# Patient Record
Sex: FEMALE | Race: WHITE | Employment: OTHER | URBAN - METROPOLITAN AREA
[De-identification: names, ages, dates, MRNs, and addresses within clinical notes are randomized per-mention and may not be internally consistent; named-entity substitution may affect disease eponyms.]

---

## 2017-11-25 ENCOUNTER — HOSPITAL ENCOUNTER (OUTPATIENT)
Dept: MAMMOGRAPHY | Age: 42
Discharge: HOME | End: 2017-11-25
Attending: OBSTETRICS & GYNECOLOGY
Payer: COMMERCIAL

## 2017-11-25 DIAGNOSIS — Z12.39 SPECIAL SCREENING EXAMINATION FOR NEOPLASM OF BREAST: ICD-10-CM

## 2017-11-25 PROCEDURE — 77067 SCR MAMMO BI INCL CAD: CPT | Performed by: OBSTETRICS & GYNECOLOGY

## 2017-11-25 PROCEDURE — 77063 BREAST TOMOSYNTHESIS BI: CPT | Performed by: OBSTETRICS & GYNECOLOGY

## 2018-07-06 ENCOUNTER — APPOINTMENT (OUTPATIENT)
Dept: GENERAL RADIOLOGY | Facility: HOSPITAL | Age: 43
End: 2018-07-06
Attending: EMERGENCY MEDICINE
Payer: COMMERCIAL

## 2018-07-06 ENCOUNTER — HOSPITAL ENCOUNTER (EMERGENCY)
Facility: HOSPITAL | Age: 43
Discharge: HOME | End: 2018-07-06
Attending: EMERGENCY MEDICINE
Payer: COMMERCIAL

## 2018-07-06 VITALS
WEIGHT: 168 LBS | SYSTOLIC BLOOD PRESSURE: 141 MMHG | HEART RATE: 62 BPM | BODY MASS INDEX: 26 KG/M2 | OXYGEN SATURATION: 100 % | RESPIRATION RATE: 16 BRPM | DIASTOLIC BLOOD PRESSURE: 89 MMHG

## 2018-07-06 DIAGNOSIS — S39.012A STRAIN OF LUMBAR REGION, INITIAL ENCOUNTER: Primary | ICD-10-CM

## 2018-07-06 LAB — POCT URINE PREGNANCY: NEGATIVE

## 2018-07-06 PROCEDURE — 72110 X-RAY EXAM L-2 SPINE 4/>VWS: CPT | Performed by: EMERGENCY MEDICINE

## 2018-07-06 PROCEDURE — 99283 EMERGENCY DEPT VISIT LOW MDM: CPT

## 2018-07-06 PROCEDURE — 96372 THER/PROPH/DIAG INJ SC/IM: CPT

## 2018-07-06 RX ORDER — CYCLOBENZAPRINE HCL 10 MG
10 TABLET ORAL 3 TIMES DAILY PRN
Qty: 15 TABLET | Refills: 0 | Status: SHIPPED | OUTPATIENT
Start: 2018-07-06 | End: 2019-07-26

## 2018-07-06 RX ORDER — METHYLPREDNISOLONE 4 MG/1
TABLET ORAL
Qty: 1 PACKAGE | Refills: 0 | Status: SHIPPED | OUTPATIENT
Start: 2018-07-06 | End: 2018-07-11

## 2018-07-06 RX ORDER — KETOROLAC TROMETHAMINE 30 MG/ML
60 INJECTION, SOLUTION INTRAMUSCULAR; INTRAVENOUS ONCE
Status: COMPLETED | OUTPATIENT
Start: 2018-07-06 | End: 2018-07-06

## 2018-07-06 RX ORDER — PREDNISONE 20 MG/1
60 TABLET ORAL ONCE
Status: COMPLETED | OUTPATIENT
Start: 2018-07-06 | End: 2018-07-06

## 2018-07-06 NOTE — ED PROVIDER NOTES
Patient Seen in: [facility] Emergency Department    History   Patient presents with:  Back Pain (musculoskeletal)    Stated Complaint: BACK PAIN    HPI    The patient is a 45-year-old female who presents emergency room with a history of straining he nontoxic in appearance. HEENT: Head is normocephalic, atraumatic. Pupils are 4 mm equally round and reactive to light. Oropharynx is clear. Mucous membranes are moist.  NECK: There is no focal tenderness to palpation appreciated. There is no JVD.  No menin (cpt=72110)    Result Date: 7/6/2018  CONCLUSION:    No fracture, subluxation, scoliosis, pars defect, abnormal bone density, disc space narrowing, facet joint space narrowing, destructive process or paraspinal calcifications or mass evident.   Dictated by: 1 tablet (10 mg total) by mouth 3 (three) times daily as needed.   Qty: 15 tablet Refills: 0

## 2019-07-26 ENCOUNTER — OFFICE VISIT (OUTPATIENT)
Dept: FAMILY MEDICINE CLINIC | Facility: CLINIC | Age: 44
End: 2019-07-26
Payer: COMMERCIAL

## 2019-07-26 VITALS
DIASTOLIC BLOOD PRESSURE: 78 MMHG | HEART RATE: 92 BPM | TEMPERATURE: 99 F | OXYGEN SATURATION: 97 % | HEIGHT: 69 IN | WEIGHT: 180 LBS | RESPIRATION RATE: 18 BRPM | SYSTOLIC BLOOD PRESSURE: 112 MMHG | BODY MASS INDEX: 26.66 KG/M2

## 2019-07-26 DIAGNOSIS — J00 ACUTE NASOPHARYNGITIS: ICD-10-CM

## 2019-07-26 DIAGNOSIS — H66.91 ACUTE RIGHT OTITIS MEDIA: Primary | ICD-10-CM

## 2019-07-26 PROCEDURE — 99213 OFFICE O/P EST LOW 20 MIN: CPT | Performed by: NURSE PRACTITIONER

## 2019-07-26 RX ORDER — IBUPROFEN 200 MG
200 TABLET ORAL EVERY 6 HOURS PRN
COMMUNITY
End: 2019-12-16 | Stop reason: ALTCHOICE

## 2019-07-26 RX ORDER — AMOXICILLIN 875 MG/1
875 TABLET, COATED ORAL 2 TIMES DAILY
Qty: 20 TABLET | Refills: 0 | Status: SHIPPED | OUTPATIENT
Start: 2019-07-26 | End: 2019-08-05

## 2019-07-26 NOTE — PATIENT INSTRUCTIONS
· It is very important to complete full course of antibiotic. · Rest and fluids  · Flonase 2 sprays in each nostril daily for 10 -14 days.     · Mucinex DM or Robitussin DM as packet insert  · Acetaminophen or ibuprofen according to package instructions a recent studies have shown that the symptoms of ear infections often go away in a couple of days without antibiotics. Bacteria can become resistant to antibiotics, and the medicine may cause side effects.  For these reasons, your healthcare provider may wait become as active as is comfortable. Ask your provider if you can take aspirin, acetaminophen, or ibuprofen to control your fever. Anyone under the age of 24 with a viral illness should not take aspirin because of the increased risk of Reye's syndrome.

## 2019-07-26 NOTE — PROGRESS NOTES
CHIEF COMPLAINT:   Patient presents with:  Sore Throat: hurts to swallow, facial pressure/headache, bilateral ear pain/pressure // x3 days      HPI:   Kathy Donnie is a 40year old female who presents to clinic today with complaints of bilteral ear p nostrils patent, clear nasal discharge, nasal mucosa pink and noninflamed  THROAT: oral mucosa pink, moist. Posterior pharynx is not erythematous or injected. No exudates. NECK: supple, non-tender  LUNGS: clear to auscultation bilaterally.  Breathing is no infection or allergic reaction can cause swelling of the tube between your ear and throat (the eustachian tube). The swelling may trap bacteria in your middle ear, resulting in a bacterial infection.    Pressure from the buildup of pus or fluid in the ear s you may need to take a different antibiotic or other medicine. In this case, it may take another 1 to 2 weeks before your ear feels normal again. What can I do to take care of myself? Follow your healthcare provider's instructions.    If you are taking infections often, ask your healthcare provider if you need to be checked for allergies. Getting treatment for allergies may help prevent ear infections. Ask if using decongestants when you have a cold may help prevent you from getting ear infections.    D

## 2019-08-13 ENCOUNTER — OFFICE VISIT (OUTPATIENT)
Dept: FAMILY MEDICINE CLINIC | Facility: CLINIC | Age: 44
End: 2019-08-13
Payer: COMMERCIAL

## 2019-08-13 VITALS
OXYGEN SATURATION: 98 % | WEIGHT: 183 LBS | SYSTOLIC BLOOD PRESSURE: 120 MMHG | HEIGHT: 69 IN | DIASTOLIC BLOOD PRESSURE: 70 MMHG | TEMPERATURE: 99 F | HEART RATE: 91 BPM | BODY MASS INDEX: 27.11 KG/M2 | RESPIRATION RATE: 16 BRPM

## 2019-08-13 DIAGNOSIS — J00 ACUTE NASOPHARYNGITIS: ICD-10-CM

## 2019-08-13 DIAGNOSIS — H66.91 RIGHT ACUTE OTITIS MEDIA: Primary | ICD-10-CM

## 2019-08-13 PROCEDURE — 99213 OFFICE O/P EST LOW 20 MIN: CPT | Performed by: NURSE PRACTITIONER

## 2019-08-13 RX ORDER — CEFDINIR 300 MG/1
300 CAPSULE ORAL 2 TIMES DAILY
Qty: 20 CAPSULE | Refills: 0 | Status: SHIPPED | OUTPATIENT
Start: 2019-08-13 | End: 2019-08-23

## 2019-08-13 NOTE — PATIENT INSTRUCTIONS
· It is very important to complete full course of antibiotic.    · Flonase as prescribed   · Acetaminophen or ibuprofen according to package instructions as needed for pain  · Call or return if symptoms worsen, do not improve in 3 days, or if fever of 100.4 without antibiotics. Bacteria can become resistant to antibiotics, and the medicine may cause side effects. For these reasons, your healthcare provider may wait 1 to 3 days to see if the symptoms go away on their own before prescribing an antibiotic.    You ibuprofen to control your fever. Anyone under the age of 24 with a viral illness should not take aspirin because of the increased risk of Reye's syndrome. Keep a daily record of your temperature.    Call your healthcare provider if you have:   a temperatu

## 2019-08-13 NOTE — PROGRESS NOTES
CHIEF COMPLAINT:   Patient presents with:  Ear Pain      HPI:   Chandu Wall is a 40year old female who presents to clinic today with complaints of ear pain. Has had for 9  days. Pain is described as sharp.   Patient reports history of ear infections No exudates. NECK: supple, non-tender  LUNGS: clear to auscultation bilaterally. Breathing is non labored.   CARDIO: RRR without murmur  EXTREMITIES: no cyanosis, clubbing or edema  LYMPH: No auricular lymphadenopathy; + right anterior cervical lymphadenop bacteria in your middle ear, resulting in a bacterial infection. Pressure from the buildup of pus or fluid in the ear sometimes causes the eardrum to tear (rupture).  The eardrum is a thin membrane that separates the delicate parts of the middle ear from normal again. What can I do to take care of myself? Follow your healthcare provider's instructions. If you are taking an antibiotic, take all of it according to the directions, even when the symptoms have gone away before you have finished it.    To h ear infections. Ask if using decongestants when you have a cold may help prevent you from getting ear infections. Developed by [address]   Published by [address].    Last modified: 2008-01-15           Patient voiced understanding and is in agreemen

## 2019-08-23 ENCOUNTER — HOSPITAL ENCOUNTER (OUTPATIENT)
Dept: MAMMOGRAPHY | Age: 44
Discharge: HOME | End: 2019-08-23
Attending: OBSTETRICS & GYNECOLOGY
Payer: COMMERCIAL

## 2019-08-23 DIAGNOSIS — Z12.31 SCREENING MAMMOGRAM, ENCOUNTER FOR: ICD-10-CM

## 2019-08-23 PROCEDURE — 77063 BREAST TOMOSYNTHESIS BI: CPT | Performed by: OBSTETRICS & GYNECOLOGY

## 2019-08-23 PROCEDURE — 77067 SCR MAMMO BI INCL CAD: CPT | Performed by: OBSTETRICS & GYNECOLOGY

## 2019-08-29 ENCOUNTER — OFFICE VISIT (OUTPATIENT)
Dept: FAMILY MEDICINE CLINIC | Facility: CLINIC | Age: 44
End: 2019-08-29
Payer: COMMERCIAL

## 2019-08-29 VITALS
DIASTOLIC BLOOD PRESSURE: 82 MMHG | RESPIRATION RATE: 16 BRPM | HEART RATE: 80 BPM | TEMPERATURE: 98 F | WEIGHT: 184.63 LBS | SYSTOLIC BLOOD PRESSURE: 136 MMHG | HEIGHT: 67.75 IN | BODY MASS INDEX: 28.31 KG/M2

## 2019-08-29 DIAGNOSIS — J02.9 SORE THROAT: Primary | ICD-10-CM

## 2019-08-29 PROCEDURE — 99213 OFFICE O/P EST LOW 20 MIN: CPT | Performed by: NURSE PRACTITIONER

## 2019-08-29 RX ORDER — LORATADINE 10 MG/1
1 CAPSULE, LIQUID FILLED ORAL DAILY
Qty: 30 CAPSULE | Refills: 0 | Status: SHIPPED | OUTPATIENT
Start: 2019-08-29 | End: 2019-09-28

## 2019-08-29 RX ORDER — PREDNISONE 20 MG/1
40 TABLET ORAL DAILY
Qty: 10 TABLET | Refills: 0 | Status: SHIPPED | OUTPATIENT
Start: 2019-08-29 | End: 2019-12-16

## 2019-08-29 NOTE — PROGRESS NOTES
Patient presents with:  Sore Throat: Itchy sore throat beginning yesterday. Recently has had 2 ear infecections treated with antibiotic each time. Finished last course 3 days ago.       HPI:  Presents with 1 day history of sore throat, cough with no producti No facial tenderness. Eyes: Conjunctivae are pink and moist without exudate or drainage. Lymphadenopathy: No cervical adenopathy. Cardiovascular: Normal rate, regular rhythm. No murmur. Pulmonary/Chest: No respiratory distress.  Effort normal. Breat

## 2019-12-16 ENCOUNTER — OFFICE VISIT (OUTPATIENT)
Dept: FAMILY MEDICINE CLINIC | Facility: CLINIC | Age: 44
End: 2019-12-16
Payer: COMMERCIAL

## 2019-12-16 VITALS
BODY MASS INDEX: 31.55 KG/M2 | WEIGHT: 201 LBS | TEMPERATURE: 99 F | HEIGHT: 67 IN | SYSTOLIC BLOOD PRESSURE: 122 MMHG | OXYGEN SATURATION: 100 % | HEART RATE: 88 BPM | DIASTOLIC BLOOD PRESSURE: 82 MMHG

## 2019-12-16 DIAGNOSIS — R68.89 FLU-LIKE SYMPTOMS: Primary | ICD-10-CM

## 2019-12-16 PROCEDURE — 87502 INFLUENZA DNA AMP PROBE: CPT | Performed by: FAMILY MEDICINE

## 2019-12-16 PROCEDURE — 99213 OFFICE O/P EST LOW 20 MIN: CPT | Performed by: FAMILY MEDICINE

## 2019-12-16 NOTE — PATIENT INSTRUCTIONS
Most viral illnesses get worse for the first 3-5 days, then plateau and improve gradually over the next 3-5 days. Monitor symptoms for now.    Use OTC meds for comfort as needed--  Use Benadryl at bedtime to reduce drainage and promote rest.  Zyrtec/Barb

## 2019-12-16 NOTE — PROGRESS NOTES
Patient presents with:  Cold: Started friday, productive cough, head pressure, ear pressure, temp of 101.5 at home, diarrhea      SUBJECTIVE:   Eliana Carr is a 40year old female who presents complaining of flu-like symptoms of fever to 101.5, malais 201 lb (91.2 kg)   LMP 12/02/2019 (Approximate)   SpO2 100%   BMI 31.48 kg/m²   Gen:  Appears moderately ill but not toxic. Ears:  Canals clear, TMs sharp bilaterally, no erythema, no bulging or effusion.   Sinuses: Non tender  Oropharynx:  Mildly erythemi follow-up for further evaluation. pt verbalized understanding of the above and agrees to plan.

## 2020-11-17 ENCOUNTER — TELEPHONE (OUTPATIENT)
Dept: FAMILY MEDICINE CLINIC | Facility: CLINIC | Age: 45
End: 2020-11-17

## 2020-11-17 NOTE — TELEPHONE ENCOUNTER
Pt is having issues with her sciatic nerve for about 3 months and she can barely walk and wants to talk to someone about this.

## 2020-11-17 NOTE — TELEPHONE ENCOUNTER
LOV  8/29/2019 with Cristel Laurent FNP. 3 months ago, giving herself a Vitamin B12 shot of 0.5cc with a TB syringe in the R buttock she thinks she hit a nerve. She had immediate pain down the back of the R leg and the skin bubbled up.  The pain has been present

## 2020-11-19 ENCOUNTER — OFFICE VISIT (OUTPATIENT)
Dept: FAMILY MEDICINE CLINIC | Facility: CLINIC | Age: 45
End: 2020-11-19
Payer: COMMERCIAL

## 2020-11-19 VITALS
OXYGEN SATURATION: 98 % | HEIGHT: 68.5 IN | SYSTOLIC BLOOD PRESSURE: 120 MMHG | RESPIRATION RATE: 16 BRPM | BODY MASS INDEX: 26.91 KG/M2 | DIASTOLIC BLOOD PRESSURE: 70 MMHG | WEIGHT: 179.63 LBS | TEMPERATURE: 97 F | HEART RATE: 72 BPM

## 2020-11-19 DIAGNOSIS — G57.01 SCIATIC NERVE LESION, RIGHT: Primary | ICD-10-CM

## 2020-11-19 PROCEDURE — 3078F DIAST BP <80 MM HG: CPT | Performed by: FAMILY MEDICINE

## 2020-11-19 PROCEDURE — 99213 OFFICE O/P EST LOW 20 MIN: CPT | Performed by: FAMILY MEDICINE

## 2020-11-19 PROCEDURE — 99072 ADDL SUPL MATRL&STAF TM PHE: CPT | Performed by: FAMILY MEDICINE

## 2020-11-19 PROCEDURE — 3008F BODY MASS INDEX DOCD: CPT | Performed by: FAMILY MEDICINE

## 2020-11-19 PROCEDURE — 3074F SYST BP LT 130 MM HG: CPT | Performed by: FAMILY MEDICINE

## 2020-11-19 RX ORDER — PYRIDOXINE HCL (VITAMIN B6) 50 MG
1 TABLET ORAL
COMMUNITY

## 2020-11-19 RX ORDER — METAXALONE 800 MG/1
TABLET ORAL 3 TIMES DAILY PRN
Qty: 30 TABLET | Refills: 1 | Status: SHIPPED | OUTPATIENT
Start: 2020-11-19 | End: 2020-12-09

## 2020-11-19 RX ORDER — PREDNISONE 10 MG/1
TABLET ORAL
Qty: 35 TABLET | Refills: 0 | Status: SHIPPED | OUTPATIENT
Start: 2020-11-19 | End: 2020-12-09

## 2020-11-19 NOTE — PROGRESS NOTES
Patient presents with:  Back Pain: sciatica  Immunization/Injection: declines flu vaccine      Subjective   HPI:   This is a 39year old female coming in for worsening sciatica x 3 moths.  Also gets b 12 and this summerafter shot, had immidate pain radiatin side and 2+ on the left side. Bicep reflexes are 2+ on the right side and 2+ on the left side. Brachioradialis reflexes are 2+ on the right side and 2+ on the left side. Patellar reflexes are 2+ on the right side and 2+ on the left side.

## 2020-12-17 ENCOUNTER — TELEPHONE (OUTPATIENT)
Dept: FAMILY MEDICINE CLINIC | Facility: CLINIC | Age: 45
End: 2020-12-17

## 2020-12-17 DIAGNOSIS — G57.01 SCIATIC NERVE LESION, RIGHT: Primary | ICD-10-CM

## 2020-12-17 NOTE — TELEPHONE ENCOUNTER
Patient saw Dr. Fiorella Boyd 11/19/20 for sciatic pain and was told if it didn't improve she may need an MRI, patient states pain has not resolved, please place order for MRI

## 2020-12-17 NOTE — TELEPHONE ENCOUNTER
Needs XR l spine 1st, last was > 2 years ago. They will deniy MRI if no recent Xray. Diagnoses and all orders for this visit:    Sciatic nerve lesion, right  -     XR LUMBAR SPINE (MIN 4 VIEWS) (CPT=72110);  Future

## 2020-12-17 NOTE — TELEPHONE ENCOUNTER
Called and talked to patient and explained that we needed to do x-ray first she said she ahs been getting x-rays from her chiropractor.  I explained that the reading from the chiropractor is not good enough for the insurance company so she will go get x-ray

## 2021-05-11 ENCOUNTER — TELEPHONE (OUTPATIENT)
Dept: FAMILY MEDICINE CLINIC | Facility: CLINIC | Age: 46
End: 2021-05-11

## 2021-05-11 ENCOUNTER — HOSPITAL ENCOUNTER (OUTPATIENT)
Age: 46
Discharge: HOME | End: 2021-05-11
Payer: COMMERCIAL

## 2021-05-11 ENCOUNTER — APPOINTMENT (OUTPATIENT)
Dept: GENERAL RADIOLOGY | Age: 46
End: 2021-05-11
Attending: NURSE PRACTITIONER
Payer: COMMERCIAL

## 2021-05-11 VITALS
SYSTOLIC BLOOD PRESSURE: 126 MMHG | OXYGEN SATURATION: 99 % | TEMPERATURE: 98 F | HEIGHT: 68 IN | WEIGHT: 170 LBS | DIASTOLIC BLOOD PRESSURE: 92 MMHG | HEART RATE: 76 BPM | RESPIRATION RATE: 18 BRPM | BODY MASS INDEX: 25.76 KG/M2

## 2021-05-11 DIAGNOSIS — R07.9 CHEST PAIN OF UNCERTAIN ETIOLOGY: Primary | ICD-10-CM

## 2021-05-11 DIAGNOSIS — R07.9 ACUTE CHEST PAIN: ICD-10-CM

## 2021-05-11 PROCEDURE — 85378 FIBRIN DEGRADE SEMIQUANT: CPT | Performed by: NURSE PRACTITIONER

## 2021-05-11 PROCEDURE — 71046 X-RAY EXAM CHEST 2 VIEWS: CPT | Performed by: NURSE PRACTITIONER

## 2021-05-11 PROCEDURE — 93000 ELECTROCARDIOGRAM COMPLETE: CPT | Performed by: NURSE PRACTITIONER

## 2021-05-11 PROCEDURE — 99214 OFFICE O/P EST MOD 30 MIN: CPT | Performed by: NURSE PRACTITIONER

## 2021-05-11 PROCEDURE — 80047 BASIC METABLC PNL IONIZED CA: CPT | Performed by: NURSE PRACTITIONER

## 2021-05-11 PROCEDURE — 84484 ASSAY OF TROPONIN QUANT: CPT | Performed by: NURSE PRACTITIONER

## 2021-05-11 PROCEDURE — 36415 COLL VENOUS BLD VENIPUNCTURE: CPT | Performed by: NURSE PRACTITIONER

## 2021-05-11 PROCEDURE — 85025 COMPLETE CBC W/AUTO DIFF WBC: CPT | Performed by: NURSE PRACTITIONER

## 2021-05-11 RX ORDER — ASPIRIN 81 MG/1
324 TABLET, CHEWABLE ORAL ONCE
Status: COMPLETED | OUTPATIENT
Start: 2021-05-11 | End: 2021-05-11

## 2021-05-11 NOTE — TELEPHONE ENCOUNTER
Left side chest pressure, SOB, had this this morning still feeling the pain and pressure.  In chest and shoulder area

## 2021-05-11 NOTE — ED PROVIDER NOTES
Patient Seen in: [facility] [address] [place] [address]      History   Patient presents with:  Chest Pain    Stated Complaint: LEFT SIDE CHEST PAIN    HPI/Subjective:   HPI    77-year-old female who is a non-smoker with a PMH of hypercholesterolemia presents wi 25.85 kg/m²         Physical Exam  Vitals and nursing note reviewed. Constitutional:       General: She is not in acute distress. Appearance: She is not toxic-appearing. HENT:      Head: Normocephalic and atraumatic.    Cardiovascular:      Rate and obtained.       PATIENT STATED HISTORY: (As transcribed by Technologist)  chest pain            FINDINGS:  Lung volumes are normal.  No consolidation or pleural effusion.  No pneumothorax.  Heart and pulmonary vessels are normal caliber.  Mediastinal conto Discharge  5/11/2021  1:50 pm    Follow-up:  Michaelle Ruff MD  [address] [address] [place] [address] [id number] [facility]    In 1 day      [address]  Schedule an trenton

## 2021-05-11 NOTE — TELEPHONE ENCOUNTER
Pt states that she has left chest and shoulder pain- sharp pain today that is not going away. Also has some SOB. Denies nausea, vomiting or diaphoresis. Took a TUMS and pain did not go away. Advised that pt go to [facility].     Pt agrees with the armani

## 2021-05-11 NOTE — ED INITIAL ASSESSMENT (HPI)
Pt c/o left sided chest pain that started this morning. Pt states that the pain is worse when she moves her left arm. Pt states that she took a tums with no relief in chest pain. Pt also states that she has some shortness of breath.

## 2021-12-22 ENCOUNTER — TELEPHONE (OUTPATIENT)
Dept: FAMILY MEDICINE CLINIC | Facility: CLINIC | Age: 46
End: 2021-12-22

## 2021-12-22 RX ORDER — FLUVOXAMINE MALEATE 50 MG/1
50 TABLET, COATED ORAL 2 TIMES DAILY
Qty: 60 TABLET | Refills: 0 | Status: SHIPPED | OUTPATIENT
Start: 2021-12-22 | End: 2021-12-23

## 2021-12-22 NOTE — TELEPHONE ENCOUNTER
lesley pt. COVID since yesterday. 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 I helped coordinate his cancer treatment but offered her options.   He is vaccinated she has not, she does not have high risk factor so she do

## 2021-12-22 NOTE — TELEPHONE ENCOUNTER
Called and talked to patient she only has a fever up to 101 goes away with tylenol no other symptoms. She is also worried because she has 3 small children. I don't think th therapeutics are available.

## 2021-12-22 NOTE — TELEPHONE ENCOUNTER
Pt tested positive today and  was just dx with stage 4 skin cancer yesterday. Pt wants to know if there is an abx she cant take. She was told it could be available to people with high risk. Pt would like to speak to a nurse ASAP.

## 2021-12-23 ENCOUNTER — TELEPHONE (OUTPATIENT)
Dept: FAMILY MEDICINE CLINIC | Facility: CLINIC | Age: 46
End: 2021-12-23

## 2021-12-23 RX ORDER — FLUVOXAMINE MALEATE 100 MG/1
100 CAPSULE, EXTENDED RELEASE ORAL DAILY
Qty: 30 CAPSULE | Refills: 2 | Status: SHIPPED | OUTPATIENT
Start: 2021-12-23 | End: 2021-12-23

## 2021-12-23 RX ORDER — FLUVOXAMINE MALEATE 100 MG/1
100 CAPSULE, EXTENDED RELEASE ORAL DAILY
Qty: 30 CAPSULE | Refills: 2 | Status: SHIPPED | OUTPATIENT
Start: 2021-12-23 | End: 2022-01-22

## 2021-12-23 NOTE — TELEPHONE ENCOUNTER
Requested Prescriptions     Signed Prescriptions Disp Refills   • fluvoxaMINE Maleate  MG Oral Capsule SR 24 Hr 30 capsule 2     Sig: Take 1 capsule (100 mg total) by mouth daily. Authorizing Provider: Larisa POST to notify.  Thanks, R

## 2021-12-23 NOTE — TELEPHONE ENCOUNTER
Pt's  is [facility] wife's [facility] for Fluvoxamine Maleate  mg none of the [facility] have it until Monday and they can not transfer it to another pharmacy, can we send it to [facility] on [place] [address] and [place].

## 2021-12-23 NOTE — TELEPHONE ENCOUNTER
Patient called and stated that the pharmacy contacted her and told her that they had tried to contact us stating that they have Fluvoxamine available in a higher dose once a day but needs Dr. Janene Cordova authorization.  Please advise

## 2022-04-29 ENCOUNTER — PATIENT OUTREACH (OUTPATIENT)
Dept: FAMILY MEDICINE CLINIC | Facility: CLINIC | Age: 47
End: 2022-04-29

## 2022-05-05 ENCOUNTER — LAB ENCOUNTER (OUTPATIENT)
Dept: LAB | Age: 47
End: 2022-05-05
Attending: INTERNAL MEDICINE
Payer: COMMERCIAL

## 2022-05-05 ENCOUNTER — OFFICE VISIT (OUTPATIENT)
Dept: INTERNAL MEDICINE CLINIC | Facility: CLINIC | Age: 47
End: 2022-05-05
Payer: COMMERCIAL

## 2022-05-05 VITALS
HEIGHT: 67 IN | WEIGHT: 192 LBS | RESPIRATION RATE: 16 BRPM | DIASTOLIC BLOOD PRESSURE: 80 MMHG | SYSTOLIC BLOOD PRESSURE: 138 MMHG | HEART RATE: 78 BPM | BODY MASS INDEX: 30.13 KG/M2

## 2022-05-05 DIAGNOSIS — E66.09 CLASS 1 OBESITY DUE TO EXCESS CALORIES WITHOUT SERIOUS COMORBIDITY WITH BODY MASS INDEX (BMI) OF 30.0 TO 30.9 IN ADULT: ICD-10-CM

## 2022-05-05 DIAGNOSIS — E78.00 PURE HYPERCHOLESTEROLEMIA: ICD-10-CM

## 2022-05-05 DIAGNOSIS — E05.90 SUBCLINICAL HYPERTHYROIDISM: ICD-10-CM

## 2022-05-05 DIAGNOSIS — Z51.81 THERAPEUTIC DRUG MONITORING: Primary | ICD-10-CM

## 2022-05-05 DIAGNOSIS — Z51.81 THERAPEUTIC DRUG MONITORING: ICD-10-CM

## 2022-05-05 LAB
ALBUMIN SERPL-MCNC: 4 G/DL (ref 3.4–5)
ALBUMIN/GLOB SERPL: 1.1 {RATIO} (ref 1–2)
ALP LIVER SERPL-CCNC: 85 U/L
ALT SERPL-CCNC: 34 U/L
ANION GAP SERPL CALC-SCNC: 5 MMOL/L (ref 0–18)
AST SERPL-CCNC: 22 U/L (ref 15–37)
BASOPHILS # BLD AUTO: 0.04 X10(3) UL (ref 0–0.2)
BASOPHILS NFR BLD AUTO: 0.6 %
BILIRUB SERPL-MCNC: 0.2 MG/DL (ref 0.1–2)
BUN BLD-MCNC: 13 MG/DL (ref 7–18)
CALCIUM BLD-MCNC: 9.2 MG/DL (ref 8.5–10.1)
CHLORIDE SERPL-SCNC: 106 MMOL/L (ref 98–112)
CO2 SERPL-SCNC: 26 MMOL/L (ref 21–32)
CREAT BLD-MCNC: 0.81 MG/DL
EOSINOPHIL # BLD AUTO: 0.08 X10(3) UL (ref 0–0.7)
EOSINOPHIL NFR BLD AUTO: 1.1 %
ERYTHROCYTE [DISTWIDTH] IN BLOOD BY AUTOMATED COUNT: 11.9 %
EST. AVERAGE GLUCOSE BLD GHB EST-MCNC: 108 MG/DL (ref 68–126)
FASTING STATUS PATIENT QL REPORTED: YES
FOLATE SERPL-MCNC: 14 NG/ML (ref 8.7–?)
GLOBULIN PLAS-MCNC: 3.5 G/DL (ref 2.8–4.4)
GLUCOSE BLD-MCNC: 83 MG/DL (ref 70–99)
HBA1C MFR BLD: 5.4 % (ref ?–5.7)
HCT VFR BLD AUTO: 41.5 %
HGB BLD-MCNC: 13.7 G/DL
IMM GRANULOCYTES # BLD AUTO: 0.01 X10(3) UL (ref 0–1)
IMM GRANULOCYTES NFR BLD: 0.1 %
LYMPHOCYTES # BLD AUTO: 1.46 X10(3) UL (ref 1–4)
LYMPHOCYTES NFR BLD AUTO: 20.3 %
MCH RBC QN AUTO: 30.6 PG (ref 26–34)
MCHC RBC AUTO-ENTMCNC: 33 G/DL (ref 31–37)
MCV RBC AUTO: 92.6 FL
MONOCYTES # BLD AUTO: 0.56 X10(3) UL (ref 0.1–1)
MONOCYTES NFR BLD AUTO: 7.8 %
NEUTROPHILS # BLD AUTO: 5.05 X10 (3) UL (ref 1.5–7.7)
NEUTROPHILS # BLD AUTO: 5.05 X10(3) UL (ref 1.5–7.7)
NEUTROPHILS NFR BLD AUTO: 70.1 %
OSMOLALITY SERPL CALC.SUM OF ELEC: 283 MOSM/KG (ref 275–295)
PLATELET # BLD AUTO: 312 10(3)UL (ref 150–450)
POTASSIUM SERPL-SCNC: 4.4 MMOL/L (ref 3.5–5.1)
PROT SERPL-MCNC: 7.5 G/DL (ref 6.4–8.2)
RBC # BLD AUTO: 4.48 X10(6)UL
SODIUM SERPL-SCNC: 137 MMOL/L (ref 136–145)
T4 FREE SERPL-MCNC: 1.1 NG/DL (ref 0.8–1.7)
TSI SER-ACNC: 1.43 MIU/ML (ref 0.36–3.74)
VIT B12 SERPL-MCNC: 887 PG/ML (ref 193–986)
VIT D+METAB SERPL-MCNC: 17.3 NG/ML (ref 30–100)
WBC # BLD AUTO: 7.2 X10(3) UL (ref 4–11)

## 2022-05-05 PROCEDURE — 3079F DIAST BP 80-89 MM HG: CPT | Performed by: INTERNAL MEDICINE

## 2022-05-05 PROCEDURE — 83036 HEMOGLOBIN GLYCOSYLATED A1C: CPT | Performed by: INTERNAL MEDICINE

## 2022-05-05 PROCEDURE — 3075F SYST BP GE 130 - 139MM HG: CPT | Performed by: INTERNAL MEDICINE

## 2022-05-05 PROCEDURE — 99204 OFFICE O/P NEW MOD 45 MIN: CPT | Performed by: INTERNAL MEDICINE

## 2022-05-05 PROCEDURE — 85025 COMPLETE CBC W/AUTO DIFF WBC: CPT | Performed by: INTERNAL MEDICINE

## 2022-05-05 PROCEDURE — 3008F BODY MASS INDEX DOCD: CPT | Performed by: INTERNAL MEDICINE

## 2022-05-05 RX ORDER — SEMAGLUTIDE 0.25 MG/.5ML
0.25 INJECTION, SOLUTION SUBCUTANEOUS WEEKLY
Qty: 2 ML | Refills: 0 | Status: SHIPPED | OUTPATIENT
Start: 2022-05-05 | End: 2022-05-27

## 2022-05-05 RX ORDER — SEMAGLUTIDE 0.5 MG/.5ML
0.5 INJECTION, SOLUTION SUBCUTANEOUS WEEKLY
Qty: 2 ML | Refills: 0 | Status: SHIPPED | OUTPATIENT
Start: 2022-05-05 | End: 2022-05-27

## 2022-05-05 NOTE — PATIENT INSTRUCTIONS
Plan:  These macronutrient values reflect your cutting calories of 1,411 calories per day  Moderate Carb (30/35/35)  106g  protein  55g  fats  123g  carbs        Continue with medications:   Go to the lab for blood work   Follow up with me in 1 month  Schedule follow up appointments: Bello Samson (dietitian) or Del Zaidi (presurgery dietitian)   Check for insurance coverage for dietitian and labwork prior to scheduling appointment. Please try to work on the following dietary changes:  1. Drink lots of water and cut down on soda/juice consumption if soda/juice drinker  2. Eat breakfast daily (within 1 hour)  3. Focus on protein: (15-30 grams with each meal) ie. greek yogurt, cottage cheese, string cheese, hard boiled eggs   4. Healthy snacks: peanut butter and apples, hummus and carrots, yogurt and berries, nuts (1/4 cup), tuna and crackers    Premier protein shakes  Quest bars  Power crunch bars   Siggi yogurt (9% milk fat)   [place] balanced breaks (cheese and nuts)- without chocolate   5. Reduce carbohydrates which includes sweets as well as rice, pasta, potatoes, bread, corn and instead choose whole grain options or more protein or vegetables. 6. Get a good night of sleep  7. Try to decrease stress in life- headspace / calm    Please download apps:  1. \"My Fitness Pal\" (other options are Lose it or Spark People) to help you to monitor daily dietary intake and you will be able to see if you are eating the right amount of calories, protein, and carbs. When you set the trenton up chose 1-2 lbs/week as a goal.  2. \"7 minute workout\" to help with exercise/activity which takes 7 minutes of your day and that you can do at home! 3. \"Calm\" which helps with mindfulness, meditation, clarity, sleep, and ladonna to your daily life. 4. Skinnytaste blog for healthy recipe ideas  5. WeddingLovely for low carb resources    HIGH PROTEIN SNACK IDEAS  -cottage cheese  -plain yogurt  -kefir  -hard-boiled eggs  -natural cheeses  -nuts (measure portion size)   -unsweetened nut butters  -dried edamame   -lizzette seeds soaked in water or almond milk  -soy nuts  -cured meats (monitor for sodium issues)   -hummus with vegetables  -bean dip with vegetables    FRUIT  Low carb fruit options   Raspberries: Half a cup (60 grams) contains 3 grams of carbs. Blackberries: Half a cup (70 grams) contains 4 grams of carbs. Strawberries: Half a cup (100 grams) contains 6 grams of carbs. Blueberries: Half a cup (50 grams) contains 6 grams of carbs. Plum: One medium-sized (80 grams) contains 6 grams of carbs.     VEGETABLES  Low carb vegetables          RESOURCES   BOOKS   Dressing on the side- [employer]   Atomic Habits -by Mary Styles  The hungry brain: the science behind why we overeat    PODCASTS   Food Psych with Shell Latch   Losing 100 lbs with Corinne   Brain over Binge by Estela Watkins

## 2022-05-10 ENCOUNTER — PATIENT MESSAGE (OUTPATIENT)
Dept: INTERNAL MEDICINE CLINIC | Facility: CLINIC | Age: 47
End: 2022-05-10

## 2022-05-10 RX ORDER — SEMAGLUTIDE 0.25 MG/.5ML
0.25 INJECTION, SOLUTION SUBCUTANEOUS WEEKLY
Qty: 2 ML | Refills: 0 | Status: SHIPPED | OUTPATIENT
Start: 2022-05-10 | End: 2022-06-01

## 2022-05-10 RX ORDER — PHENTERMINE HYDROCHLORIDE 37.5 MG/1
37.5 TABLET ORAL
Qty: 30 TABLET | Refills: 0 | Status: SHIPPED | OUTPATIENT
Start: 2022-05-10

## 2022-05-12 NOTE — TELEPHONE ENCOUNTER
Patient called to talk about problems getting Wegovy 0.25 mg (she did not read mychart)- I did discuss the back order and possible pharmacies she can try and see if on hand. She will let us know if she is able to find or not and we can look for alternative.

## 2022-06-03 ENCOUNTER — TELEPHONE (OUTPATIENT)
Dept: INTERNAL MEDICINE CLINIC | Facility: CLINIC | Age: 47
End: 2022-06-03

## 2022-06-03 NOTE — TELEPHONE ENCOUNTER
Patient could not get wegovy and MD gave her Saxenda to use - sample. She has one week left and wants to know next step. I told her wegovy is on back order possibly through August.  Patient is tolerating saxenda but not losing any weight. She is at 1.2 mg.  I advised she will likely stay on Saxenda and need order to pharmacy. It may take higher dose to achieve appetite control. Pended order for you to sign please.

## 2022-06-05 RX ORDER — LIRAGLUTIDE 6 MG/ML
3 INJECTION, SOLUTION SUBCUTANEOUS DAILY
Qty: 15 ML | Refills: 1 | Status: SHIPPED | OUTPATIENT
Start: 2022-06-05

## 2022-06-05 RX ORDER — PEN NEEDLE, DIABETIC 30 GX3/16"
1 NEEDLE, DISPOSABLE MISCELLANEOUS DAILY
Qty: 100 EACH | Refills: 1 | Status: SHIPPED | OUTPATIENT
Start: 2022-06-05

## 2022-06-05 NOTE — TELEPHONE ENCOUNTER
I would have her keep going on saxenda till she hits 3.0 mg   Ok for add'l sample if we have   Then I can switch to higher dose wegovy

## 2022-06-09 ENCOUNTER — TELEPHONE (OUTPATIENT)
Dept: INTERNAL MEDICINE CLINIC | Facility: CLINIC | Age: 47
End: 2022-06-09

## 2022-06-09 DIAGNOSIS — E78.00 PURE HYPERCHOLESTEROLEMIA: Primary | ICD-10-CM

## 2022-06-09 DIAGNOSIS — E66.09 CLASS 1 OBESITY DUE TO EXCESS CALORIES WITHOUT SERIOUS COMORBIDITY WITH BODY MASS INDEX (BMI) OF 30.0 TO 30.9 IN ADULT: ICD-10-CM

## 2022-06-09 NOTE — TELEPHONE ENCOUNTER
Bethany Downing RN Hi Amy,     Can you please enter a Dietitian Referral for patient as she is seeing Irene Damon next Tuesday 6/14/22.      Reminder to include any cardiovascular risk factor in addition to obesity       DONE

## 2022-06-14 ENCOUNTER — OFFICE VISIT (OUTPATIENT)
Dept: INTERNAL MEDICINE CLINIC | Facility: CLINIC | Age: 47
End: 2022-06-14
Payer: COMMERCIAL

## 2022-06-14 DIAGNOSIS — E78.00 PURE HYPERCHOLESTEROLEMIA: ICD-10-CM

## 2022-06-14 DIAGNOSIS — E66.9 OBESITY (BMI 30.0-34.9): ICD-10-CM

## 2022-06-14 PROCEDURE — 97802 MEDICAL NUTRITION INDIV IN: CPT | Performed by: DIETITIAN, REGISTERED

## 2022-06-24 ENCOUNTER — PATIENT MESSAGE (OUTPATIENT)
Dept: INTERNAL MEDICINE CLINIC | Facility: CLINIC | Age: 47
End: 2022-06-24

## 2022-06-27 RX ORDER — SEMAGLUTIDE 1.7 MG/.75ML
1.7 INJECTION, SOLUTION SUBCUTANEOUS WEEKLY
Qty: 3 ML | Refills: 0 | Status: SHIPPED | OUTPATIENT
Start: 2022-06-27 | End: 2022-07-19

## 2022-06-27 NOTE — TELEPHONE ENCOUNTER
I would like her to stay on 3.0 mg for atleast 1-2 weeks   Ok then to switch to [facility], it is much stronger so monitor closely for side effects.    Rx is sent

## 2022-06-27 NOTE — TELEPHONE ENCOUNTER
From: Bethany HUMPHRIES  To: Briseida Chávez  Sent: 6/6/2022 9:30 AM CDT  Subject: Felicita Veloz,  I did speak with Dr. Clyde Yates and she wants you to stay on [facility] [address] [place] [address] until you reach 3.0 mg daily. Then she would be able to switch you to a higher dose of Wegovy that should be available. She did send the prescription for Saxenda to your pharmacy. Thank you.   SPRING Sims

## 2022-07-19 ENCOUNTER — OFFICE VISIT (OUTPATIENT)
Dept: INTERNAL MEDICINE CLINIC | Facility: CLINIC | Age: 47
End: 2022-07-19
Payer: COMMERCIAL

## 2022-07-19 VITALS
HEIGHT: 67 IN | SYSTOLIC BLOOD PRESSURE: 120 MMHG | HEART RATE: 78 BPM | DIASTOLIC BLOOD PRESSURE: 78 MMHG | WEIGHT: 180 LBS | RESPIRATION RATE: 16 BRPM | BODY MASS INDEX: 28.25 KG/M2

## 2022-07-19 DIAGNOSIS — Z51.81 THERAPEUTIC DRUG MONITORING: Primary | ICD-10-CM

## 2022-07-19 DIAGNOSIS — E66.09 CLASS 1 OBESITY DUE TO EXCESS CALORIES WITHOUT SERIOUS COMORBIDITY WITH BODY MASS INDEX (BMI) OF 30.0 TO 30.9 IN ADULT: ICD-10-CM

## 2022-07-19 DIAGNOSIS — E78.00 PURE HYPERCHOLESTEROLEMIA: ICD-10-CM

## 2022-07-19 DIAGNOSIS — K21.9 GASTROESOPHAGEAL REFLUX DISEASE WITHOUT ESOPHAGITIS: ICD-10-CM

## 2022-07-19 PROCEDURE — 3008F BODY MASS INDEX DOCD: CPT | Performed by: INTERNAL MEDICINE

## 2022-07-19 PROCEDURE — 3078F DIAST BP <80 MM HG: CPT | Performed by: INTERNAL MEDICINE

## 2022-07-19 PROCEDURE — 99214 OFFICE O/P EST MOD 30 MIN: CPT | Performed by: INTERNAL MEDICINE

## 2022-07-19 PROCEDURE — 3074F SYST BP LT 130 MM HG: CPT | Performed by: INTERNAL MEDICINE

## 2022-07-19 RX ORDER — ONDANSETRON 4 MG/1
4 TABLET, FILM COATED ORAL EVERY 8 HOURS PRN
Qty: 20 TABLET | Refills: 0 | Status: SHIPPED | OUTPATIENT
Start: 2022-07-19

## 2022-07-19 RX ORDER — SEMAGLUTIDE 2.4 MG/.75ML
2.4 INJECTION, SOLUTION SUBCUTANEOUS WEEKLY
Qty: 9 ML | Refills: 0 | Status: SHIPPED | OUTPATIENT
Start: 2022-07-19 | End: 2022-10-05

## 2022-08-01 RX ORDER — ERGOCALCIFEROL 1.25 MG/1
CAPSULE ORAL
Qty: 12 CAPSULE | Refills: 0 | OUTPATIENT
Start: 2022-08-01

## 2022-08-01 NOTE — TELEPHONE ENCOUNTER
Requesting vitamin D  LOV: 7/19/22  RTC: 8 weeks  Last Relevant Labs: 5/10/22 17.3  Filled: 5/10/22 #12 with 0 refills    Future Appointments   Date Time Provider Mandie Coker   9/7/2022  1:00 PM Alex Pugh RD [facility]   10/18/2022  8:40 AM Aranza Henry MD [facility]     Therapy complete.  denied

## 2022-08-02 RX ORDER — ERGOCALCIFEROL 1.25 MG/1
CAPSULE ORAL
Qty: 12 CAPSULE | Refills: 0 | OUTPATIENT
Start: 2022-08-02

## 2022-08-02 NOTE — TELEPHONE ENCOUNTER
Requesting Vitamin D  LOV: 7/19/22  RTC: 8 weeks  Last Relevant Labs: 5/5/22  Filled: 5/10/22 #12 with 0 refills    Future Appointments   Date Time Provider Mandie Coker   9/7/2022  1:00 PM Hamlet Marina RD [facility]   10/18/2022  8:40 AM Rosibel Sahni MD [facility]     Is to be on OTC daily now - denied refill

## 2022-10-18 ENCOUNTER — OFFICE VISIT (OUTPATIENT)
Dept: INTERNAL MEDICINE CLINIC | Facility: CLINIC | Age: 47
End: 2022-10-18
Payer: COMMERCIAL

## 2022-10-18 VITALS
HEIGHT: 67 IN | RESPIRATION RATE: 16 BRPM | WEIGHT: 168 LBS | SYSTOLIC BLOOD PRESSURE: 110 MMHG | OXYGEN SATURATION: 98 % | DIASTOLIC BLOOD PRESSURE: 78 MMHG | HEART RATE: 70 BPM | BODY MASS INDEX: 26.37 KG/M2

## 2022-10-18 DIAGNOSIS — E55.9 VITAMIN D DEFICIENCY: ICD-10-CM

## 2022-10-18 DIAGNOSIS — Z51.81 THERAPEUTIC DRUG MONITORING: Primary | ICD-10-CM

## 2022-10-18 DIAGNOSIS — E66.09 CLASS 1 OBESITY DUE TO EXCESS CALORIES WITHOUT SERIOUS COMORBIDITY WITH BODY MASS INDEX (BMI) OF 30.0 TO 30.9 IN ADULT: ICD-10-CM

## 2022-10-18 PROCEDURE — 3074F SYST BP LT 130 MM HG: CPT | Performed by: INTERNAL MEDICINE

## 2022-10-18 PROCEDURE — 3078F DIAST BP <80 MM HG: CPT | Performed by: INTERNAL MEDICINE

## 2022-10-18 PROCEDURE — 99214 OFFICE O/P EST MOD 30 MIN: CPT | Performed by: INTERNAL MEDICINE

## 2022-10-18 PROCEDURE — 3008F BODY MASS INDEX DOCD: CPT | Performed by: INTERNAL MEDICINE

## 2022-10-18 RX ORDER — SEMAGLUTIDE 2.4 MG/.75ML
2.4 INJECTION, SOLUTION SUBCUTANEOUS WEEKLY
COMMUNITY
End: 2022-10-18

## 2022-10-18 RX ORDER — ERGOCALCIFEROL 1.25 MG/1
50000 CAPSULE ORAL WEEKLY
Qty: 12 CAPSULE | Refills: 0 | Status: SHIPPED | OUTPATIENT
Start: 2022-10-18 | End: 2022-11-17

## 2022-10-18 RX ORDER — SEMAGLUTIDE 2.4 MG/.75ML
2.4 INJECTION, SOLUTION SUBCUTANEOUS WEEKLY
Qty: 4 EACH | Refills: 3 | Status: SHIPPED | OUTPATIENT
Start: 2022-10-18

## 2022-12-08 ENCOUNTER — HOSPITAL ENCOUNTER (OUTPATIENT)
Dept: MAMMOGRAPHY | Age: 47
Discharge: HOME | End: 2022-12-08
Attending: ADVANCED PRACTICE MIDWIFE
Payer: COMMERCIAL

## 2022-12-08 DIAGNOSIS — Z12.31 SCREENING MAMMOGRAM FOR BREAST CANCER: ICD-10-CM

## 2022-12-08 PROCEDURE — 77063 BREAST TOMOSYNTHESIS BI: CPT | Performed by: ADVANCED PRACTICE MIDWIFE

## 2022-12-08 PROCEDURE — 77067 SCR MAMMO BI INCL CAD: CPT | Performed by: ADVANCED PRACTICE MIDWIFE

## 2022-12-13 ENCOUNTER — TELEMEDICINE (OUTPATIENT)
Dept: INTERNAL MEDICINE CLINIC | Facility: CLINIC | Age: 47
End: 2022-12-13

## 2022-12-13 DIAGNOSIS — E66.3 OVERWEIGHT WITH BODY MASS INDEX (BMI) 25.0-29.9: ICD-10-CM

## 2022-12-13 DIAGNOSIS — E78.00 PURE HYPERCHOLESTEROLEMIA: ICD-10-CM

## 2022-12-13 PROCEDURE — 97803 MED NUTRITION INDIV SUBSEQ: CPT | Performed by: DIETITIAN, REGISTERED

## 2023-01-19 ENCOUNTER — OFFICE VISIT (OUTPATIENT)
Dept: INTERNAL MEDICINE CLINIC | Facility: CLINIC | Age: 48
End: 2023-01-19
Payer: COMMERCIAL

## 2023-01-19 VITALS
RESPIRATION RATE: 16 BRPM | HEIGHT: 67 IN | BODY MASS INDEX: 25.43 KG/M2 | HEART RATE: 95 BPM | DIASTOLIC BLOOD PRESSURE: 72 MMHG | SYSTOLIC BLOOD PRESSURE: 122 MMHG | WEIGHT: 162 LBS

## 2023-01-19 DIAGNOSIS — E78.00 PURE HYPERCHOLESTEROLEMIA: ICD-10-CM

## 2023-01-19 DIAGNOSIS — K21.9 GASTROESOPHAGEAL REFLUX DISEASE WITHOUT ESOPHAGITIS: ICD-10-CM

## 2023-01-19 DIAGNOSIS — E66.09 CLASS 1 OBESITY DUE TO EXCESS CALORIES WITHOUT SERIOUS COMORBIDITY WITH BODY MASS INDEX (BMI) OF 30.0 TO 30.9 IN ADULT: ICD-10-CM

## 2023-01-19 DIAGNOSIS — Z51.81 THERAPEUTIC DRUG MONITORING: Primary | ICD-10-CM

## 2023-01-19 PROCEDURE — 3078F DIAST BP <80 MM HG: CPT | Performed by: INTERNAL MEDICINE

## 2023-01-19 PROCEDURE — 3074F SYST BP LT 130 MM HG: CPT | Performed by: INTERNAL MEDICINE

## 2023-01-19 PROCEDURE — 99213 OFFICE O/P EST LOW 20 MIN: CPT | Performed by: INTERNAL MEDICINE

## 2023-01-19 PROCEDURE — 3008F BODY MASS INDEX DOCD: CPT | Performed by: INTERNAL MEDICINE

## 2023-01-19 RX ORDER — SEMAGLUTIDE 2.4 MG/.75ML
2.4 INJECTION, SOLUTION SUBCUTANEOUS WEEKLY
Qty: 12 EACH | Refills: 3 | Status: SHIPPED | OUTPATIENT
Start: 2023-01-19

## 2023-01-23 RX ORDER — ERGOCALCIFEROL 1.25 MG/1
CAPSULE ORAL
Qty: 12 CAPSULE | Refills: 0 | OUTPATIENT
Start: 2023-01-23

## 2023-06-21 RX ORDER — SEMAGLUTIDE 2.4 MG/.75ML
2.4 INJECTION, SOLUTION SUBCUTANEOUS WEEKLY
Qty: 12 EACH | Refills: 3 | OUTPATIENT
Start: 2023-06-21

## 2023-07-10 RX ORDER — SEMAGLUTIDE 2.4 MG/.75ML
2.4 INJECTION, SOLUTION SUBCUTANEOUS WEEKLY
Qty: 3 ML | Refills: 1 | Status: SHIPPED | OUTPATIENT
Start: 2023-07-10

## 2023-08-14 RX ORDER — SEMAGLUTIDE 2.4 MG/.75ML
2.4 INJECTION, SOLUTION SUBCUTANEOUS WEEKLY
Qty: 12 EACH | Refills: 3 | OUTPATIENT
Start: 2023-08-14

## 2023-09-26 ENCOUNTER — OFFICE VISIT (OUTPATIENT)
Dept: INTERNAL MEDICINE CLINIC | Facility: CLINIC | Age: 48
End: 2023-09-26
Payer: COMMERCIAL

## 2023-09-26 VITALS
WEIGHT: 161 LBS | HEART RATE: 78 BPM | RESPIRATION RATE: 16 BRPM | DIASTOLIC BLOOD PRESSURE: 78 MMHG | SYSTOLIC BLOOD PRESSURE: 122 MMHG | HEIGHT: 67 IN | BODY MASS INDEX: 25.27 KG/M2

## 2023-09-26 DIAGNOSIS — E78.00 PURE HYPERCHOLESTEROLEMIA: ICD-10-CM

## 2023-09-26 DIAGNOSIS — Z51.81 THERAPEUTIC DRUG MONITORING: Primary | ICD-10-CM

## 2023-09-26 PROCEDURE — 3078F DIAST BP <80 MM HG: CPT | Performed by: INTERNAL MEDICINE

## 2023-09-26 PROCEDURE — 99213 OFFICE O/P EST LOW 20 MIN: CPT | Performed by: INTERNAL MEDICINE

## 2023-09-26 PROCEDURE — 3008F BODY MASS INDEX DOCD: CPT | Performed by: INTERNAL MEDICINE

## 2023-09-26 PROCEDURE — 3074F SYST BP LT 130 MM HG: CPT | Performed by: INTERNAL MEDICINE

## 2023-09-26 RX ORDER — ONDANSETRON 4 MG/1
4 TABLET, FILM COATED ORAL EVERY 8 HOURS PRN
Qty: 20 TABLET | Refills: 2 | Status: SHIPPED | OUTPATIENT
Start: 2023-09-26

## 2023-10-04 RX ORDER — SEMAGLUTIDE 2.4 MG/.75ML
2.4 INJECTION, SOLUTION SUBCUTANEOUS WEEKLY
Qty: 3 EACH | Refills: 1 | Status: SHIPPED | OUTPATIENT
Start: 2023-10-04

## 2023-10-04 NOTE — TELEPHONE ENCOUNTER
Requesting   Requested Prescriptions     Pending Prescriptions Disp Refills    WEGOVY 2.4 MG/0.75ML Subcutaneous Solution Auto-injector [Pharmacy Med Name: Loan Emmanuels 2.4 MG/0.75 ML PEN]  1     Sig: INJECT 0.75 ML (2.4 MG TOTAL) INTO THE SKIN ONCE A WEEK. LOV: 9/26/23  RTC: not noted  Filled: 7/10/23 #3 with 1 refills    No future appointments.

## 2023-10-06 ENCOUNTER — TELEPHONE (OUTPATIENT)
Dept: FAMILY MEDICINE CLINIC | Facility: CLINIC | Age: 48
End: 2023-10-06

## 2023-10-06 ENCOUNTER — HOSPITAL ENCOUNTER (OUTPATIENT)
Age: 48
Discharge: HOME | End: 2023-10-06
Payer: COMMERCIAL

## 2023-10-06 VITALS
SYSTOLIC BLOOD PRESSURE: 131 MMHG | HEIGHT: 69 IN | BODY MASS INDEX: 22.96 KG/M2 | RESPIRATION RATE: 16 BRPM | DIASTOLIC BLOOD PRESSURE: 84 MMHG | WEIGHT: 155 LBS | HEART RATE: 71 BPM | OXYGEN SATURATION: 98 % | TEMPERATURE: 98 F

## 2023-10-06 DIAGNOSIS — N30.01 ACUTE CYSTITIS WITH HEMATURIA: Primary | ICD-10-CM

## 2023-10-06 LAB
GLUCOSE UR STRIP-MCNC: 100 MG/DL
KETONES UR STRIP-MCNC: 15 MG/DL
NITRITE UR QL STRIP: POSITIVE
PH UR STRIP: 5 [PH]
PROT UR STRIP-MCNC: 100 MG/DL
SP GR UR STRIP: 1.02
UROBILINOGEN UR STRIP-ACNC: 4 MG/DL

## 2023-10-06 PROCEDURE — 87186 SC STD MICRODIL/AGAR DIL: CPT | Performed by: PHYSICIAN ASSISTANT

## 2023-10-06 PROCEDURE — 81002 URINALYSIS NONAUTO W/O SCOPE: CPT | Performed by: PHYSICIAN ASSISTANT

## 2023-10-06 PROCEDURE — 87086 URINE CULTURE/COLONY COUNT: CPT | Performed by: PHYSICIAN ASSISTANT

## 2023-10-06 PROCEDURE — 87077 CULTURE AEROBIC IDENTIFY: CPT | Performed by: PHYSICIAN ASSISTANT

## 2023-10-06 PROCEDURE — 99213 OFFICE O/P EST LOW 20 MIN: CPT | Performed by: PHYSICIAN ASSISTANT

## 2023-10-06 RX ORDER — PHENAZOPYRIDINE HYDROCHLORIDE 200 MG/1
200 TABLET, FILM COATED ORAL 3 TIMES DAILY PRN
Qty: 6 TABLET | Refills: 0 | Status: SHIPPED | OUTPATIENT
Start: 2023-10-06 | End: 2023-10-13

## 2023-10-06 RX ORDER — CEPHALEXIN 500 MG/1
500 CAPSULE ORAL 4 TIMES DAILY
Qty: 28 CAPSULE | Refills: 0 | Status: SHIPPED | OUTPATIENT
Start: 2023-10-06 | End: 2023-10-13

## 2023-10-06 NOTE — ED INITIAL ASSESSMENT (HPI)
Pt c/o urgency, frequency and burning with urination, pt states her symptoms started yesterday morning.

## 2023-10-06 NOTE — TELEPHONE ENCOUNTER
I called and talked to patient and told her she should go to the [facility] to be seen she insisted that we either see her or send in an antibiotic for her. I explained that we do not have any openings until Monday.

## 2023-10-06 NOTE — DISCHARGE INSTRUCTIONS
Continue to increase fluids and rest   take the antibiotics as directed until gone  may use pyridium as needed for pain - will turn your urine orange  we will call you with your culture  follow up with primary care doctor in 48 hours for recheck  return to the Urgent care or return to the Emergency room if symptoms worsen

## 2023-10-06 NOTE — TELEPHONE ENCOUNTER
I'm not sending her any medications without a U/A, I doubt any of the other providers will either, but you can ask them. Should follow original recommendation for [facility].

## 2023-11-03 RX ORDER — SEMAGLUTIDE 2.4 MG/.75ML
2.4 INJECTION, SOLUTION SUBCUTANEOUS WEEKLY
Qty: 3 EACH | Refills: 1 | OUTPATIENT
Start: 2023-11-03

## 2023-11-03 NOTE — TELEPHONE ENCOUNTER
Requesting   Requested Prescriptions     Pending Prescriptions Disp Refills    semaglutide-weight management (WEGOVY) 2.4 MG/0.75ML Subcutaneous Solution Auto-injector 3 each 1     Sig: Inject 0.75 mL (2.4 mg total) into the skin once a week. LOV: 9/26/23  RTC:   Last Relevant Labs:   Filled: 10/4/23 #30 with 1 refills    No future appointments.

## 2023-11-09 ENCOUNTER — TELEPHONE (OUTPATIENT)
Dept: INTERNAL MEDICINE CLINIC | Facility: CLINIC | Age: 48
End: 2023-11-09

## 2023-11-09 RX ORDER — SEMAGLUTIDE 2.4 MG/.75ML
2.4 INJECTION, SOLUTION SUBCUTANEOUS WEEKLY
Qty: 3 EACH | Refills: 11 | Status: SHIPPED | OUTPATIENT
Start: 2023-11-09

## 2023-11-09 NOTE — TELEPHONE ENCOUNTER
Patient left a message that she saw Dr. Annamarie Robles recently and was told she would be given order for [facility] for a year? She went to [facility] and there was only a month. Please clarify why a month was sent and not a year. Per Dr. Annamarie Robles okay for one year of wegovy 2.4 mg but patient must be seen every 6 months. Resent and my chart sent to patient.

## 2023-12-11 ENCOUNTER — HOSPITAL ENCOUNTER (OUTPATIENT)
Dept: MAMMOGRAPHY | Age: 48
Discharge: HOME | End: 2023-12-11
Attending: ADVANCED PRACTICE MIDWIFE
Payer: COMMERCIAL

## 2023-12-11 DIAGNOSIS — Z12.31 SCREENING MAMMOGRAM FOR BREAST CANCER: ICD-10-CM

## 2023-12-11 PROCEDURE — 77063 BREAST TOMOSYNTHESIS BI: CPT | Performed by: ADVANCED PRACTICE MIDWIFE

## 2023-12-11 PROCEDURE — 77067 SCR MAMMO BI INCL CAD: CPT | Performed by: ADVANCED PRACTICE MIDWIFE

## 2024-03-19 RX ORDER — SEMAGLUTIDE 2.4 MG/.75ML
2.4 INJECTION, SOLUTION SUBCUTANEOUS WEEKLY
Qty: 3 EACH | Refills: 11 | Status: SHIPPED | OUTPATIENT
Start: 2024-03-19 | End: 2024-03-21

## 2024-03-20 ENCOUNTER — TELEPHONE (OUTPATIENT)
Dept: INTERNAL MEDICINE CLINIC | Facility: CLINIC | Age: 49
End: 2024-03-20

## 2024-03-20 NOTE — TELEPHONE ENCOUNTER
I called the # listed on her card she emailed  [phone]  [employer]  ID [id number] IS ID  GROUP [id number]    I spoke to representative as it was transferred to [place] for her pharmacy benefits.  They cannot verify her - no active coverage.  May be managed by another pharmacy plan.

## 2024-03-20 NOTE — TELEPHONE ENCOUNTER
Patient sent my chart 3/19/24 that PA is needed for Wegovy 2.4 mg  Applied today on [facility] (Key: [id number])    Awaiting decision.    I just did all this and called patient.  She got new insurance today.  I asked Donis to update by calling her      NEED TO RESUBMIT NOW

## 2024-03-21 RX ORDER — SEMAGLUTIDE 2.4 MG/.75ML
2.4 INJECTION, SOLUTION SUBCUTANEOUS WEEKLY
Qty: 9 ML | Refills: 1 | Status: SHIPPED | OUTPATIENT
Start: 2024-03-21

## 2024-03-21 NOTE — TELEPHONE ENCOUNTER
I called # given by patient which is for [facility]  spoke to Amelia  ID listed on card is not what we use for this   Wegovy 2.4 mg PA  Has lost 5%  Diagnosis is overweight E66.3 and Pure Hypercholesterolemia E78.00    [id number] is the id # per Amelia she is able to verify with [facility] and the approval is good until 3/20/25  She gets a paid claim

## 2024-03-22 NOTE — TELEPHONE ENCOUNTER
LM for patient as she called to clarify mail order.   Sent her message yesterday and LVM now to call me if needed.

## 2024-06-21 ENCOUNTER — PATIENT MESSAGE (OUTPATIENT)
Dept: INTERNAL MEDICINE CLINIC | Facility: CLINIC | Age: 49
End: 2024-06-21

## 2024-06-21 ENCOUNTER — HOSPITAL ENCOUNTER (OUTPATIENT)
Age: 49
Discharge: HOME | End: 2024-06-21

## 2024-06-21 VITALS
WEIGHT: 152 LBS | DIASTOLIC BLOOD PRESSURE: 84 MMHG | HEART RATE: 70 BPM | HEIGHT: 68 IN | RESPIRATION RATE: 20 BRPM | TEMPERATURE: 99 F | OXYGEN SATURATION: 100 % | BODY MASS INDEX: 23.04 KG/M2 | SYSTOLIC BLOOD PRESSURE: 136 MMHG

## 2024-06-21 DIAGNOSIS — R11.0 NAUSEA: ICD-10-CM

## 2024-06-21 DIAGNOSIS — R19.7 NAUSEA VOMITING AND DIARRHEA: Primary | ICD-10-CM

## 2024-06-21 DIAGNOSIS — R00.2 PALPITATIONS: ICD-10-CM

## 2024-06-21 DIAGNOSIS — R11.2 NAUSEA VOMITING AND DIARRHEA: Primary | ICD-10-CM

## 2024-06-21 LAB
#MXD IC: 0.3 X10ˆ3/UL (ref 0.1–1)
ATRIAL RATE: 79 BPM
BUN BLD-MCNC: 14 MG/DL (ref 7–18)
CHLORIDE BLD-SCNC: 105 MMOL/L (ref 98–112)
CO2 BLD-SCNC: 26 MMOL/L (ref 21–32)
CREAT BLD-MCNC: 0.9 MG/DL
EGFRCR SERPLBLD CKD-EPI 2021: 78 ML/MIN/1.73M2 (ref 60–?)
GLUCOSE BLD-MCNC: 114 MG/DL (ref 70–99)
HCT VFR BLD AUTO: 36.3 %
HCT VFR BLD CALC: 38 %
HGB BLD-MCNC: 12.1 G/DL
ISTAT IONIZED CALCIUM FOR CHEM 8: 1.22 MMOL/L (ref 1.12–1.32)
LYMPHOCYTES # BLD AUTO: 1 X10ˆ3/UL (ref 1–4)
LYMPHOCYTES NFR BLD AUTO: 12.7 %
MCH RBC QN AUTO: 28.2 PG (ref 26–34)
MCHC RBC AUTO-ENTMCNC: 33.3 G/DL (ref 31–37)
MCV RBC AUTO: 84.6 FL (ref 80–100)
MIXED CELL %: 3.7 %
NEUTROPHILS # BLD AUTO: 6.7 X10ˆ3/UL (ref 1.5–7.7)
NEUTROPHILS NFR BLD AUTO: 83.6 %
P AXIS: 73 DEGREES
P-R INTERVAL: 140 MS
PLATELET # BLD AUTO: 291 X10ˆ3/UL (ref 150–450)
POCT INFLUENZA A: NEGATIVE
POCT INFLUENZA B: NEGATIVE
POTASSIUM BLD-SCNC: 4.1 MMOL/L (ref 3.6–5.1)
Q-T INTERVAL: 372 MS
QRS DURATION: 78 MS
QTC CALCULATION (BEZET): 426 MS
R AXIS: 68 DEGREES
RBC # BLD AUTO: 4.29 X10ˆ6/UL
SARS-COV-2 RNA RESP QL NAA+PROBE: NOT DETECTED
SODIUM BLD-SCNC: 139 MMOL/L (ref 136–145)
T AXIS: 50 DEGREES
TROPONIN I BLD-MCNC: <0.02 NG/ML
VENTRICULAR RATE: 79 BPM
WBC # BLD AUTO: 8 X10ˆ3/UL (ref 4–11)

## 2024-06-21 PROCEDURE — 80047 BASIC METABLC PNL IONIZED CA: CPT | Performed by: PHYSICIAN ASSISTANT

## 2024-06-21 PROCEDURE — 96360 HYDRATION IV INFUSION INIT: CPT | Performed by: PHYSICIAN ASSISTANT

## 2024-06-21 PROCEDURE — 99214 OFFICE O/P EST MOD 30 MIN: CPT | Performed by: PHYSICIAN ASSISTANT

## 2024-06-21 PROCEDURE — 84484 ASSAY OF TROPONIN QUANT: CPT | Performed by: PHYSICIAN ASSISTANT

## 2024-06-21 PROCEDURE — 85025 COMPLETE CBC W/AUTO DIFF WBC: CPT | Performed by: PHYSICIAN ASSISTANT

## 2024-06-21 PROCEDURE — 87502 INFLUENZA DNA AMP PROBE: CPT | Performed by: PHYSICIAN ASSISTANT

## 2024-06-21 PROCEDURE — U0002 COVID-19 LAB TEST NON-CDC: HCPCS | Performed by: PHYSICIAN ASSISTANT

## 2024-06-21 PROCEDURE — 93000 ELECTROCARDIOGRAM COMPLETE: CPT | Performed by: PHYSICIAN ASSISTANT

## 2024-06-21 RX ORDER — SODIUM CHLORIDE 9 MG/ML
1000 INJECTION, SOLUTION INTRAVENOUS ONCE
Status: COMPLETED | OUTPATIENT
Start: 2024-06-21 | End: 2024-06-21

## 2024-06-21 RX ORDER — ONDANSETRON 4 MG/1
4 TABLET, ORALLY DISINTEGRATING ORAL EVERY 8 HOURS PRN
Qty: 10 TABLET | Refills: 0 | Status: SHIPPED | OUTPATIENT
Start: 2024-06-21

## 2024-06-21 NOTE — TELEPHONE ENCOUNTER
I called patient to f/u on my chart.  She was at urgent care this morning.  C/o shaking uncontrollable, vomiting and diarrhea - severe, was short of breath  She took her wegovy 2.4 mg on Monday and woke last night with these symptoms.  She has had this happen 3 times before this episode - the others were when she was out of town.  She thought maybe stress related or anxiety. She did go out last night to celebrate her daughters birthday - some pasta, calamari, antonio salad.  Discussed the need to limit carbs with her and how that impacts the reaction to the medicine.    They checked her heart, labs - all normal.  They told her to return to an ER if she develops symptoms again . I spoke with Dr. Cramer and she wants patient to stay off GLP-1 for a week, stay well-hydrated, watch diet, give zofran odt for use if needed.  Patient is to call us prior to resuming wegovy 2.4 mg after one week off to determine if lower dose is needed or she should discontinue medication.  I discussed the importance of following the diet guidelines (carb limiting, smaller more frequent meals needed, focus on proteins and good hydration).  Did let her know that in rare cases you can have pancreatitis with the shot - she would want to watch for abdominal pain and vomiting.    No imodium with her symptoms right now since uncertain of use with GLP-1  Patient was told she must return to the ER if she has symptoms return.  Patient verbalized understanding.  I also told patient she must be seen by Dr. Cramer - her last visit was September.  I told her to schedule first available and get on her wait list.  She will call our .     Zofrant ODT 4 mg sent to [facility] #10 per verbal order Dr. Cramer.

## 2024-06-21 NOTE — DISCHARGE INSTRUCTIONS
Call and schedule an appointment with your PCP for close follow-up also schedule appointment with weight loss center    Hydrate well    If this occurs again you should be reevaluated in the emergency room

## 2024-06-21 NOTE — ED PROVIDER NOTES
Patient Seen in: [facility]      History     Chief Complaint   Patient presents with    Nausea/Vomiting/Diarrhea     Stated Complaint: Nauseu, diarrhea, cold    Subjective:   HPI    49-year-old female who comes in today complaining of dizziness nausea vomiting and diarrhea that has been happening and weight this over the past 6 months has happened 4 times and most recently happened last night.  She states she wakes up feeling rigors shakes and heart pounding and then had multiple episodes of diarrhea and vomiting she states this has happened 3 times before.  She has been on Wegovy and it took her Wegovy shot on Monday Tuesday she felt generally nauseated.  She just feels overall weak and dizzy now but denies any vomiting or diarrhea.  Is any active chest pain palpitations or shortness of breath    Objective:   History reviewed. No pertinent past medical history.           History reviewed. No pertinent surgical history.             Social History     Socioeconomic History    Marital status:    Occupational History    Occupation:    Tobacco Use    Smoking status: Never    Smokeless tobacco: Never   Vaping Use    Vaping status: Never Used   Substance and Sexual Activity    Alcohol use: Yes     Alcohol/week: 1.0 - 2.0 standard drink of alcohol     Types: 1 - 2 Standard drinks or equivalent per week     Comment: wine 1-2 times a week    Drug use: No   Other Topics Concern     Service No    Caffeine Concern No    Exercise Yes     Comment: walking    Seat Belt Yes    Self-Exams Yes     Comment: self breast exam every 3 months              Review of Systems    Positive for stated complaint: Nauseu, diarrhea, cold  Other systems are as noted in HPI.  Constitutional and vital signs reviewed.      All other systems reviewed and negative except as noted above.    Physical Exam     ED Triage Vitals [06/21/24 0843]   /89   Pulse 79   Resp 22   Temp 99.2 °F (37.3 °C)    Temp src Temporal   SpO2 99 %   O2 Device None (Room air)       Current Vitals:   Vital Signs  BP: 136/84  Pulse: 70  Resp: 20  Temp: 98.9 °F (37.2 °C)  Temp src: Oral    Oxygen Therapy  SpO2: 100 %  O2 Device: None (Room air)            Physical Exam  Vitals and nursing note reviewed.   Constitutional:       General: She is not in acute distress.     Appearance: She is well-developed. She is not diaphoretic.   HENT:      Head: Normocephalic and atraumatic.      Right Ear: External ear normal.      Left Ear: External ear normal.      Mouth/Throat:      Pharynx: No oropharyngeal exudate.   Eyes:      General: No scleral icterus.     Conjunctiva/sclera: Conjunctivae normal.      Pupils: Pupils are equal, round, and reactive to light.   Cardiovascular:      Rate and Rhythm: Normal rate and regular rhythm.      Heart sounds: Normal heart sounds.   Pulmonary:      Effort: Pulmonary effort is normal. No respiratory distress.      Breath sounds: Normal breath sounds. No wheezing or rales.   Abdominal:      General: Abdomen is flat. Bowel sounds are normal.      Palpations: Abdomen is soft.      Tenderness: There is no abdominal tenderness.   Musculoskeletal:         General: No tenderness. Normal range of motion.      Cervical back: Normal range of motion.   Lymphadenopathy:      Cervical: No cervical adenopathy.   Skin:     General: Skin is warm and dry.      Capillary Refill: Capillary refill takes less than 2 seconds.      Coloration: Skin is not pale.      Findings: No erythema or rash.   Neurological:      Mental Status: She is alert and oriented to person, place, and time.   Psychiatric:         Behavior: Behavior normal.         Thought Content: Thought content normal.         Judgment: Judgment normal.             ED Course     Labs Reviewed   POCT ISTAT CHEM8 CARTRIDGE - Abnormal; Notable for the following components:       Result Value    ISTAT Glucose 114 (*)     All other components within normal limits    ISTAT TROPONIN - Normal   RAPID SARS-COV-2 BY PCR - Normal   POCT FLU TEST - Normal    Narrative:     This assay is a rapid molecular in vitro test utilizing nucleic acid amplification of influenza A and B viral RNA.   POCT CBC     EKG    Rate, intervals and axes as noted on EKG Report.  Rate: 79  Rhythm: Sinus Rhythm  Reading: Sinus rhythm, EKG reviewed with Dr. Omer               MDM          Medical Decision Making  49-year-old female who comes in today with complaints of over the past 6 months having 4 episodes where she wakes up in the middle of the night and what she describes as rigors with chest palpitations and multiple episodes of vomiting and diarrhea.  Patient is on Wegovy.  She is on the 2.4 mg.  She reports that she is pretty nauseated the day after she takes it.  She denies any alcohol use.  Denies any other symptoms.  She denies any current chest pain just feels overall dizzy and weak.    Problems Addressed:  Nausea vomiting and diarrhea: acute illness or injury  Palpitations: acute illness or injury    Amount and/or Complexity of Data Reviewed  Labs: ordered. Decision-making details documented in ED Course.     Details: Cbc: WNL Platelets, hemoglobin, WBC  BMP: glucose non fasting   Radiology: ordered and independent interpretation performed. Decision-making details documented in ED Course.  ECG/medicine tests: ordered and independent interpretation performed. Decision-making details documented in ED Course.     Details: I personally reviewed the patient's EKG patient is in normal sinus rhythm with no ST depression or ST elevation, when compared to patient's previous EKG there is no significant change found    IV fluid bolus, Zofran for nausea  Discussion of management or test interpretation with external provider(s): Discussed with and evaluated the patient with Dr. Omer who agrees with assessment and plan.    Risk  OTC drugs.  Prescription drug management.  Risk Details: Clinical Impression:  Nausea vomiting and diarrhea, palpitations       The differential diagnosis before testing included medication side effect, anxiety, gastroparesis, viral syndrome which is a medical condition that poses a threat to life/function.     Discussed with the patient proper hydration discussed following up with her PCP and also her weight loss doctor regarding some of the side effects of the medication but also some of the symptoms she has been having to see if they could be related.  Patient verbalizes understanding.  Patient currently is feeling well she received bag of IV fluids.            Disposition and Plan     Clinical Impression:  1. Nausea vomiting and diarrhea    2. Nausea    3. Palpitations         Disposition:  Discharge  6/21/2024 10:52 am    Follow-up:  Fabien Camacho MD  [address] [facility] [address]  [place] [address]  [phone]    Schedule an appointment as soon as possible for a visit in 2 days      Jennifer Cramer MD  [facility]  [place] [address]  [phone]    Schedule an appointment as soon as possible for a visit in 2 days      [facility]  [address] [facility] [address] [phone]  Go to   If symptoms worsen          Medications Prescribed:  Discharge Medication List as of 6/21/2024 10:54 AM               This report has been produced using speech recognition software and may contain errors related to that system including, but not limited to, errors in grammar, punctuation, and spelling, as well as words and phrases that possibly may have been recognized inappropriately.  If there are any questions or concerns, contact the dictating provider for clarification.     NOTE: The 21st Century Cares Act makes medical notes available to patients.  Be advised that this is a medical document written in medical language and may contain abbreviations or verbiage that is unfamiliar or direct.  It is primarily intended to carry relevant historical information,  physical exam findings, and the clinical assessment of the physician.

## 2024-06-21 NOTE — ED INITIAL ASSESSMENT (HPI)
Dizziness, nausea vomiting and diarrhea since Monday  Taking semaglutide x 1 year  but is concerned she is having a reaction  States she wakes up with her \"heart pounding\" in the morning and feels anxious when she wakes up in the morning.   Took zofran today for nausea with no relief  Drinking a pepsi from home

## 2024-06-21 NOTE — TELEPHONE ENCOUNTER
From: Nikia Strickland  To: Jennifer Cramer  Sent: 6/21/2024 11:35 AM CDT  Subject: Was just in urgent care     Hi I was extremely sick all night. Fourth time this has happened. I am worried the medication is causing this and have questions for Dr Cramer. All of my results came back normal but very concerned. Is it possible to talk to  today, video call or appt?

## 2024-06-25 ENCOUNTER — LAB ENCOUNTER (OUTPATIENT)
Dept: LAB | Age: 49
End: 2024-06-25
Attending: INTERNAL MEDICINE

## 2024-06-25 ENCOUNTER — OFFICE VISIT (OUTPATIENT)
Dept: INTERNAL MEDICINE CLINIC | Facility: CLINIC | Age: 49
End: 2024-06-25

## 2024-06-25 VITALS
SYSTOLIC BLOOD PRESSURE: 114 MMHG | DIASTOLIC BLOOD PRESSURE: 78 MMHG | HEART RATE: 87 BPM | BODY MASS INDEX: 23.79 KG/M2 | WEIGHT: 157 LBS | RESPIRATION RATE: 16 BRPM | HEIGHT: 68 IN

## 2024-06-25 DIAGNOSIS — R19.7 DIARRHEA, UNSPECIFIED TYPE: ICD-10-CM

## 2024-06-25 DIAGNOSIS — E78.00 PURE HYPERCHOLESTEROLEMIA: ICD-10-CM

## 2024-06-25 DIAGNOSIS — Z51.81 THERAPEUTIC DRUG MONITORING: Primary | ICD-10-CM

## 2024-06-25 DIAGNOSIS — E66.09 CLASS 1 OBESITY DUE TO EXCESS CALORIES WITHOUT SERIOUS COMORBIDITY WITH BODY MASS INDEX (BMI) OF 30.0 TO 30.9 IN ADULT: ICD-10-CM

## 2024-06-25 DIAGNOSIS — Z51.81 THERAPEUTIC DRUG MONITORING: ICD-10-CM

## 2024-06-25 DIAGNOSIS — R11.15 PERSISTENT RECURRENT VOMITING: ICD-10-CM

## 2024-06-25 LAB
ALBUMIN SERPL-MCNC: 3.8 G/DL (ref 3.4–5)
ALBUMIN/GLOB SERPL: 1.1 {RATIO} (ref 1–2)
ALP LIVER SERPL-CCNC: 74 U/L
ALT SERPL-CCNC: 22 U/L
AMYLASE SERPL-CCNC: 61 U/L (ref 25–115)
ANION GAP SERPL CALC-SCNC: 4 MMOL/L (ref 0–18)
AST SERPL-CCNC: 17 U/L (ref 15–37)
BILIRUB SERPL-MCNC: 0.4 MG/DL (ref 0.1–2)
BUN BLD-MCNC: 11 MG/DL (ref 9–23)
CALCIUM BLD-MCNC: 8.9 MG/DL (ref 8.5–10.1)
CHLORIDE SERPL-SCNC: 108 MMOL/L (ref 98–112)
CO2 SERPL-SCNC: 27 MMOL/L (ref 21–32)
CREAT BLD-MCNC: 1.13 MG/DL
EGFRCR SERPLBLD CKD-EPI 2021: 60 ML/MIN/1.73M2 (ref 60–?)
FASTING STATUS PATIENT QL REPORTED: NO
GLOBULIN PLAS-MCNC: 3.5 G/DL (ref 2.8–4.4)
GLUCOSE BLD-MCNC: 106 MG/DL (ref 70–99)
LIPASE SERPL-CCNC: 103 U/L (ref ?–300)
MAGNESIUM SERPL-MCNC: 2.2 MG/DL (ref 1.6–2.6)
OSMOLALITY SERPL CALC.SUM OF ELEC: 288 MOSM/KG (ref 275–295)
POTASSIUM SERPL-SCNC: 4.2 MMOL/L (ref 3.5–5.1)
PROT SERPL-MCNC: 7.3 G/DL (ref 6.4–8.2)
SODIUM SERPL-SCNC: 139 MMOL/L (ref 136–145)

## 2024-06-25 PROCEDURE — 83735 ASSAY OF MAGNESIUM: CPT | Performed by: INTERNAL MEDICINE

## 2024-06-25 PROCEDURE — 80053 COMPREHEN METABOLIC PANEL: CPT | Performed by: INTERNAL MEDICINE

## 2024-06-25 PROCEDURE — 3008F BODY MASS INDEX DOCD: CPT | Performed by: INTERNAL MEDICINE

## 2024-06-25 PROCEDURE — 3074F SYST BP LT 130 MM HG: CPT | Performed by: INTERNAL MEDICINE

## 2024-06-25 PROCEDURE — 82150 ASSAY OF AMYLASE: CPT | Performed by: INTERNAL MEDICINE

## 2024-06-25 PROCEDURE — 3078F DIAST BP <80 MM HG: CPT | Performed by: INTERNAL MEDICINE

## 2024-06-25 PROCEDURE — 99214 OFFICE O/P EST MOD 30 MIN: CPT | Performed by: INTERNAL MEDICINE

## 2024-06-25 PROCEDURE — 83690 ASSAY OF LIPASE: CPT | Performed by: INTERNAL MEDICINE

## 2024-06-25 NOTE — PROGRESS NOTES
HISTORY OF PRESENT ILLNESS  Chief Complaint   Patient presents with    Weight Check     Down 4        Nikia Linda Strickland is a 49 year old female here for follow up in medical weight loss program.     Denies chest pain, shortness of breath, dizziness, blurred vision, headache, paresthesia, nausea/vomiting.     Has had 4 episodes of diarrhea/ vomiting and shakiness and last about 6 hours on average.   First time though food poisoning.   This time happened at home.   Stopping travel at times because of it   Started the last 6 month s    Starts with abdominal pain, diarrhea then has about 7-8 times then can't catch her breath and then vomits at the last sx Still has some food from the night before           Wt Readings from Last 6 Encounters:   06/25/24 157 lb (71.2 kg)   06/21/24 152 lb (68.9 kg)   10/06/23 155 lb (70.3 kg)   09/26/23 161 lb (73 kg)   01/19/23 162 lb (73.5 kg)   10/18/22 168 lb (76.2 kg)            Breakfast Lunch Dinner Snacks Fluids              REVIEW OF SYSTEMS  GENERAL HEALTH: feels well otherwise, denied any fevers chills or night sweats   RESPIRATORY: denies shortness of breath   CARDIOVASCULAR: denies chest pain  GI: denies abdominal pain    EXAM  /78   Pulse 87   Resp 16   Ht 5' 8\" (1.727 m)   Wt 157 lb (71.2 kg)   LMP 06/24/2024 (Exact Date)   BMI 23.87 kg/m²   GENERAL: well developed, well nourished,in no apparent distress, A/O x3  SKIN: no rashes,no suspicious lesions  HEENT: atraumatic, normocephalic, OP-clear, PERRL  NECK: supple,no adenopathy  LUNGS: clear to auscultation bilaterally   CARDIO: RRR without murmur  GI: good BS's,NT/ND, no masses or HSM  EXTREMITIES: no cyanosis, no clubbing, no edema    Lab Results   Component Value Date    WBC 7.2 05/05/2022    RBC 4.48 05/05/2022    HGB 13.7 05/05/2022    HCT 41.5 05/05/2022    MCV 84.6 06/21/2024    MCH 30.6 05/05/2022    MCHC 33.3 06/21/2024    RDW 11.9 05/05/2022    .0 05/05/2022     Lab Results   Component Value  Date     (H) 06/25/2024    BUN 11 06/25/2024    CREATSERUM 1.13 (H) 06/25/2024    ANIONGAP 4 06/25/2024    GFR 79 04/08/2016    GFRNAA 87 05/05/2022    GFRAA 101 05/05/2022    CA 8.9 06/25/2024    OSMOCALC 288 06/25/2024    ALKPHO 74 06/25/2024    AST 17 06/25/2024    ALT 22 06/25/2024    BILT 0.4 06/25/2024    TP 7.3 06/25/2024    ALB 3.8 06/25/2024    GLOBULIN 3.5 06/25/2024     06/25/2024    K 4.2 06/25/2024     06/25/2024    CO2 27.0 06/25/2024     Lab Results   Component Value Date     05/05/2022    A1C 5.4 05/05/2022     Lab Results   Component Value Date    CHOLEST 205 (H) 04/08/2016    TRIG 53 04/08/2016    HDL 97 04/08/2016    LDL 97 04/08/2016    VLDL 11 04/08/2016    TCHDLRATIO 2.11 04/08/2016    NONHDLC 108 04/08/2016     Lab Results   Component Value Date    T4F 1.1 05/05/2022    TSH 1.430 05/05/2022     Lab Results   Component Value Date    B12 887 05/05/2022     Lab Results   Component Value Date    VITD 17.3 (L) 05/05/2022       Current Outpatient Medications on File Prior to Visit   Medication Sig Dispense Refill    ondansetron 4 MG Oral Tablet Dispersible Take 1 tablet (4 mg total) by mouth every 8 (eight) hours as needed for Nausea. 10 tablet 0    ondansetron (ZOFRAN) 4 mg tablet Take 1 tablet (4 mg total) by mouth every 8 (eight) hours as needed for Nausea. 20 tablet 2     No current facility-administered medications on file prior to visit.       ASSESSMENT  Analyzed weight data:       Diagnoses and all orders for this visit:    Therapeutic drug monitoring  -     CT ABD (ATTN PANCREAS) W/WO+PELVIS W/(CPT=74178); Future  -     Discontinue: semaglutide-weight management 1.7 MG/0.75ML Subcutaneous Solution Auto-injector; Inject 0.75 mL (1.7 mg total) into the skin once a week.  -     Gastro Referral - In Network  -     semaglutide-weight management 1 MG/0.5ML Subcutaneous Solution Auto-injector; Inject 0.5 mL (1 mg total) into the skin once a week for 4 doses.  -     Comp  Metabolic Panel (14); Future  -     Magnesium [E]; Future  -     Lipase [E]; Future  -     Amylase [E]; Future  -     CT ABDOMEN+PELVIS(CPT=74176); Future    Class 1 obesity due to excess calories without serious comorbidity with body mass index (BMI) of 30.0 to 30.9 in adult  -     CT ABD (ATTN PANCREAS) W/WO+PELVIS W/(CPT=74178); Future  -     Discontinue: semaglutide-weight management 1.7 MG/0.75ML Subcutaneous Solution Auto-injector; Inject 0.75 mL (1.7 mg total) into the skin once a week.  -     Gastro Referral - In Network  -     semaglutide-weight management 1 MG/0.5ML Subcutaneous Solution Auto-injector; Inject 0.5 mL (1 mg total) into the skin once a week for 4 doses.  -     Comp Metabolic Panel (14); Future  -     Magnesium [E]; Future  -     Lipase [E]; Future  -     Amylase [E]; Future  -     CT ABDOMEN+PELVIS(CPT=74176); Future    Pure hypercholesterolemia  -     CT ABD (ATTN PANCREAS) W/WO+PELVIS W/(CPT=74178); Future  -     Discontinue: semaglutide-weight management 1.7 MG/0.75ML Subcutaneous Solution Auto-injector; Inject 0.75 mL (1.7 mg total) into the skin once a week.  -     Gastro Referral - In Network  -     semaglutide-weight management 1 MG/0.5ML Subcutaneous Solution Auto-injector; Inject 0.5 mL (1 mg total) into the skin once a week for 4 doses.  -     Comp Metabolic Panel (14); Future  -     Magnesium [E]; Future  -     Lipase [E]; Future  -     Amylase [E]; Future  -     CT ABDOMEN+PELVIS(CPT=74176); Future    Persistent recurrent vomiting  -     CT ABD (ATTN PANCREAS) W/WO+PELVIS W/(CPT=74178); Future  -     Discontinue: semaglutide-weight management 1.7 MG/0.75ML Subcutaneous Solution Auto-injector; Inject 0.75 mL (1.7 mg total) into the skin once a week.  -     Gastro Referral - In Network  -     semaglutide-weight management 1 MG/0.5ML Subcutaneous Solution Auto-injector; Inject 0.5 mL (1 mg total) into the skin once a week for 4 doses.  -     Comp Metabolic Panel (14); Future  -      Magnesium [E]; Future  -     Lipase [E]; Future  -     Amylase [E]; Future  -     CT ABDOMEN+PELVIS(CPT=74176); Future    Diarrhea, unspecified type  -     CT ABD (ATTN PANCREAS) W/WO+PELVIS W/(CPT=74178); Future  -     Discontinue: semaglutide-weight management 1.7 MG/0.75ML Subcutaneous Solution Auto-injector; Inject 0.75 mL (1.7 mg total) into the skin once a week.  -     Gastro Referral - In Network  -     semaglutide-weight management 1 MG/0.5ML Subcutaneous Solution Auto-injector; Inject 0.5 mL (1 mg total) into the skin once a week for 4 doses.  -     Comp Metabolic Panel (14); Future  -     Magnesium [E]; Future  -     Lipase [E]; Future  -     Amylase [E]; Future  -     CT ABDOMEN+PELVIS(CPT=74176); Future          PLAN  5/5/22 at 192   Down 4  Down 35 lb total  Total time spent on chart review, pre-charting, obtaining history, counseling, and educating, reviewing labs was 30 minutes.  Reviewed GI symptoms at non pattern times over past 6 monts   Start weaning wegovy  Check lipase, amylase, cmp and magnesium   Check ct abd/ pelvis  -advised of side effects and adverse effects of this medication  Advised if symptoms persist off wegovy, follow up with GI   Add zofran prn   PCP advised of treatment plan  Nutrition: low carb diet/ recommended to eat breakfast daily/ regular protein intake  Medication use and side effects reviewed with patient.  Medication contraindications: n/a  Follow up with dietitian and psychologist as recommended.  Discussed the role of sleep and stress in weight management.  Counseled on comprehensive weight loss plan including attention to nutrition, exercise and behavior/stress management for success. See patient instruction below for more details.  Discussed strategies to overcome barriers to successful weight loss and weight maintenance  FITTE: ACSM recommendations (150-300 minutes/ week in active weight loss)   Weight Loss consent to treat reviewed and signed     There are no Patient  Instructions on file for this visit.    No follow-ups on file.    Patient verbalizes understanding.    Jennifer Cramer MD

## 2024-06-26 ENCOUNTER — TELEPHONE (OUTPATIENT)
Dept: INTERNAL MEDICINE CLINIC | Facility: CLINIC | Age: 49
End: 2024-06-26

## 2024-06-26 DIAGNOSIS — R79.89 ELEVATED SERUM CREATININE: Primary | ICD-10-CM

## 2024-10-30 ENCOUNTER — ORDER TRANSCRIPTION (OUTPATIENT)
Dept: ADMINISTRATIVE | Facility: HOSPITAL | Age: 49
End: 2024-10-30

## 2024-10-30 DIAGNOSIS — Z12.31 ENCOUNTER FOR SCREENING MAMMOGRAM FOR MALIGNANT NEOPLASM OF BREAST: Primary | ICD-10-CM

## 2024-11-08 ENCOUNTER — PATIENT MESSAGE (OUTPATIENT)
Dept: INTERNAL MEDICINE CLINIC | Facility: CLINIC | Age: 49
End: 2024-11-08

## 2024-11-21 ENCOUNTER — MED REC SCAN ONLY (OUTPATIENT)
Dept: FAMILY MEDICINE CLINIC | Facility: CLINIC | Age: 49
End: 2024-11-21

## 2024-12-21 DIAGNOSIS — E66.09 CLASS 1 OBESITY DUE TO EXCESS CALORIES WITHOUT SERIOUS COMORBIDITY WITH BODY MASS INDEX (BMI) OF 30.0 TO 30.9 IN ADULT: ICD-10-CM

## 2024-12-21 DIAGNOSIS — E66.811 CLASS 1 OBESITY DUE TO EXCESS CALORIES WITHOUT SERIOUS COMORBIDITY WITH BODY MASS INDEX (BMI) OF 30.0 TO 30.9 IN ADULT: ICD-10-CM

## 2024-12-23 RX ORDER — SEMAGLUTIDE 0.5 MG/.5ML
0.5 INJECTION, SOLUTION SUBCUTANEOUS WEEKLY
Refills: 0 | OUTPATIENT
Start: 2024-12-23

## 2025-01-21 NOTE — PROGRESS NOTES
HISTORY OF PRESENT ILLNESS  Chief Complaint   Patient presents with    Weight Check     Up 17        Nikia Strickland is a 49 year old female here for follow up in medical weight loss program.     Denies chest pain, shortness of breath, dizziness, blurred vision, headache, paresthesia, nausea/vomiting.     Reviewed GI side effects  Had Ct scan in [place] and everything was negative  Felt significantly better   Did have more food noise with 0.5 mg weekly         Wt Readings from Last 6 Encounters:   01/22/25 175 lb (79.4 kg)   06/25/24 157 lb (71.2 kg)   06/21/24 152 lb (68.9 kg)   10/06/23 155 lb (70.3 kg)   09/26/23 161 lb (73 kg)   01/19/23 162 lb (73.5 kg)            Breakfast Lunch Dinner Snacks Fluids   Reviewed             REVIEW OF SYSTEMS  GENERAL HEALTH: feels well otherwise, denied any fevers chills or night sweats   RESPIRATORY: denies shortness of breath   CARDIOVASCULAR: denies chest pain  GI: denies abdominal pain    EXAM  /74   Pulse 85   Resp 20   Ht 5' 8\" (1.727 m)   Wt 175 lb (79.4 kg)   LMP 06/24/2024 (Exact Date)   BMI 26.61 kg/m²   GENERAL: well developed, well nourished,in no apparent distress, A/O x3  SKIN: no rashes,no suspicious lesions  HEENT: atraumatic, normocephalic, OP-clear, PERRL  NECK: supple,no adenopathy  LUNGS: clear to auscultation bilaterally   CARDIO: RRR without murmur  GI: good BS's,NT/ND, no masses or HSM  EXTREMITIES: no cyanosis, no clubbing, no edema    Lab Results   Component Value Date    WBC 7.2 05/05/2022    RBC 4.48 05/05/2022    HGB 13.7 05/05/2022    HCT 41.5 05/05/2022    MCV 84.6 06/21/2024    MCH 30.6 05/05/2022    MCHC 33.3 06/21/2024    RDW 11.9 05/05/2022    .0 05/05/2022     Lab Results   Component Value Date     (H) 06/25/2024    BUN 11 06/25/2024    CREATSERUM 1.13 (H) 06/25/2024    ANIONGAP 4 06/25/2024    GFR 79 04/08/2016    GFRNAA 87 05/05/2022    GFRAA 101 05/05/2022    CA 8.9 06/25/2024    OSMOCALC 288 06/25/2024     ALKPHO 74 06/25/2024    AST 17 06/25/2024    ALT 22 06/25/2024    BILT 0.4 06/25/2024    TP 7.3 06/25/2024    ALB 3.8 06/25/2024    GLOBULIN 3.5 06/25/2024     06/25/2024    K 4.2 06/25/2024     06/25/2024    CO2 27.0 06/25/2024     Lab Results   Component Value Date     05/05/2022    A1C 5.4 05/05/2022     Lab Results   Component Value Date    CHOLEST 205 (H) 04/08/2016    TRIG 53 04/08/2016    HDL 97 04/08/2016    LDL 97 04/08/2016    VLDL 11 04/08/2016    TCHDLRATIO 2.11 04/08/2016    NONHDLC 108 04/08/2016     Lab Results   Component Value Date    T4F 1.1 05/05/2022    TSH 1.430 05/05/2022     Lab Results   Component Value Date    B12 887 05/05/2022     Lab Results   Component Value Date    VITD 17.3 (L) 05/05/2022       Current Outpatient Medications on File Prior to Visit   Medication Sig Dispense Refill    celecoxib 200 MG Oral Cap Take 1 capsule (200 mg total) by mouth 2 (two) times daily as needed for Pain.      cephalexin 250 MG Oral Cap Take 1 capsule (250 mg total) by mouth Q12H.      cephALEXin 500 MG Oral Cap Take 1 capsule (500 mg total) by mouth 2 (two) times daily.      diazePAM 5 MG Oral Tab Take 1 tablet (5 mg total) by mouth every 8 (eight) hours as needed.      famotidine 20 MG Oral Tab Take 1 tablet (20 mg total) by mouth daily.      gabapentin 300 MG Oral Cap Take 1 capsule (300 mg total) by mouth 3 (three) times daily as needed.      HYDROcodone-acetaminophen  MG Oral Tab every 28 days. FOR 21 DAYS IN, THEN REMOVE FOR 7 DAYS      hydrOXYzine 25 MG Oral Tab TAKE 1 TABLET BY MOUTH FOUR TIMES DAILY FOR 10 DAYS AS NEEDED FOR ANXIETY       No current facility-administered medications on file prior to visit.       ASSESSMENT  Analyzed weight data:       Diagnoses and all orders for this visit:    Therapeutic drug monitoring  -     Tirzepatide-Weight Management (ZEPBOUND) 5 MG/0.5ML Subcutaneous Solution Auto-injector; Inject 5 mg into the skin once a week for 4  doses.    Diarrhea, unspecified type  -     Tirzepatide-Weight Management (ZEPBOUND) 5 MG/0.5ML Subcutaneous Solution Auto-injector; Inject 5 mg into the skin once a week for 4 doses.    Persistent recurrent vomiting  -     Tirzepatide-Weight Management (ZEPBOUND) 5 MG/0.5ML Subcutaneous Solution Auto-injector; Inject 5 mg into the skin once a week for 4 doses.    Pure hypercholesterolemia  -     Tirzepatide-Weight Management (ZEPBOUND) 5 MG/0.5ML Subcutaneous Solution Auto-injector; Inject 5 mg into the skin once a week for 4 doses.    Elevated serum creatinine  -     Tirzepatide-Weight Management (ZEPBOUND) 5 MG/0.5ML Subcutaneous Solution Auto-injector; Inject 5 mg into the skin once a week for 4 doses.    Class 1 obesity due to excess calories without serious comorbidity with body mass index (BMI) of 30.0 to 30.9 in adult  -     Tirzepatide-Weight Management (ZEPBOUND) 5 MG/0.5ML Subcutaneous Solution Auto-injector; Inject 5 mg into the skin once a week for 4 doses.    Other orders  -     Tirzepatide-Weight Management (ZEPBOUND) 7.5 MG/0.5ML Subcutaneous Solution Auto-injector; Inject 7.5 mg into the skin once a week for 4 doses.  -     Tirzepatide-Weight Management (ZEPBOUND) 10 MG/0.5ML Subcutaneous Solution Auto-injector; Inject 10 mg into the skin once a week.            PLAN  5/5/22 at 192   UP 17 lb   Total time spent on chart review, pre-charting, obtaining history, counseling, and educating, reviewing labs was 30 minutes.  Reviewed GI symptoms at non pattern times over past 6 monts   Did not tolerat the wegovy retrial, increased anxiety and gi symptoms returned  -advised of side effects and adverse effects of this medication  Trial of zepbound   -advised of side effects and adverse effects of this medication    Nutrition: low carb diet/ recommended to eat breakfast daily/ regular protein intake  Medication use and side effects reviewed with patient.  Medication contraindications: n/a  Follow up with  dietitian and psychologist as recommended.  Discussed the role of sleep and stress in weight management.  Counseled on comprehensive weight loss plan including attention to nutrition, exercise and behavior/stress management for success. See patient instruction below for more details.  Discussed strategies to overcome barriers to successful weight loss and weight maintenance  FITTE: ACSM recommendations (150-300 minutes/ week in active weight loss)   Weight Loss consent to treat reviewed and signed     There are no Patient Instructions on file for this visit.    No follow-ups on file.    Patient verbalizes understanding.    Jennifer Cramer MD

## 2025-01-22 ENCOUNTER — OFFICE VISIT (OUTPATIENT)
Dept: INTERNAL MEDICINE CLINIC | Facility: CLINIC | Age: 50
End: 2025-01-22
Payer: COMMERCIAL

## 2025-01-22 VITALS
RESPIRATION RATE: 20 BRPM | HEART RATE: 85 BPM | HEIGHT: 68 IN | SYSTOLIC BLOOD PRESSURE: 118 MMHG | DIASTOLIC BLOOD PRESSURE: 74 MMHG | WEIGHT: 175 LBS | BODY MASS INDEX: 26.52 KG/M2

## 2025-01-22 DIAGNOSIS — Z51.81 THERAPEUTIC DRUG MONITORING: Primary | ICD-10-CM

## 2025-01-22 DIAGNOSIS — R79.89 ELEVATED SERUM CREATININE: ICD-10-CM

## 2025-01-22 DIAGNOSIS — E78.00 PURE HYPERCHOLESTEROLEMIA: ICD-10-CM

## 2025-01-22 DIAGNOSIS — R19.7 DIARRHEA, UNSPECIFIED TYPE: ICD-10-CM

## 2025-01-22 DIAGNOSIS — E66.09 CLASS 1 OBESITY DUE TO EXCESS CALORIES WITHOUT SERIOUS COMORBIDITY WITH BODY MASS INDEX (BMI) OF 30.0 TO 30.9 IN ADULT: ICD-10-CM

## 2025-01-22 DIAGNOSIS — R11.15 PERSISTENT RECURRENT VOMITING: ICD-10-CM

## 2025-01-22 DIAGNOSIS — E66.811 CLASS 1 OBESITY DUE TO EXCESS CALORIES WITHOUT SERIOUS COMORBIDITY WITH BODY MASS INDEX (BMI) OF 30.0 TO 30.9 IN ADULT: ICD-10-CM

## 2025-01-22 PROCEDURE — 3008F BODY MASS INDEX DOCD: CPT | Performed by: INTERNAL MEDICINE

## 2025-01-22 PROCEDURE — 3074F SYST BP LT 130 MM HG: CPT | Performed by: INTERNAL MEDICINE

## 2025-01-22 PROCEDURE — 3078F DIAST BP <80 MM HG: CPT | Performed by: INTERNAL MEDICINE

## 2025-01-22 PROCEDURE — 99214 OFFICE O/P EST MOD 30 MIN: CPT | Performed by: INTERNAL MEDICINE

## 2025-01-22 RX ORDER — TIRZEPATIDE 5 MG/.5ML
5 INJECTION, SOLUTION SUBCUTANEOUS WEEKLY
Qty: 2 ML | Refills: 0 | Status: SHIPPED | OUTPATIENT
Start: 2025-01-22 | End: 2025-02-13

## 2025-01-22 RX ORDER — FAMOTIDINE 20 MG/1
20 TABLET, FILM COATED ORAL DAILY
COMMUNITY
Start: 2024-08-12

## 2025-01-22 RX ORDER — SEMAGLUTIDE 2.4 MG/.75ML
0.75 INJECTION, SOLUTION SUBCUTANEOUS WEEKLY
COMMUNITY
Start: 2025-01-16 | End: 2025-01-22

## 2025-01-22 RX ORDER — GABAPENTIN 300 MG/1
300 CAPSULE ORAL 3 TIMES DAILY PRN
COMMUNITY
Start: 2024-12-19

## 2025-01-22 RX ORDER — TIRZEPATIDE 7.5 MG/.5ML
7.5 INJECTION, SOLUTION SUBCUTANEOUS WEEKLY
Qty: 2 ML | Refills: 0 | Status: SHIPPED | OUTPATIENT
Start: 2025-01-22 | End: 2025-02-13

## 2025-01-22 RX ORDER — CEPHALEXIN 500 MG/1
500 CAPSULE ORAL 2 TIMES DAILY
COMMUNITY
Start: 2024-12-17

## 2025-01-22 RX ORDER — DIAZEPAM 5 MG/1
5 TABLET ORAL EVERY 8 HOURS PRN
COMMUNITY
Start: 2024-11-13

## 2025-01-22 RX ORDER — TIRZEPATIDE 10 MG/.5ML
10 INJECTION, SOLUTION SUBCUTANEOUS WEEKLY
Qty: 2 ML | Refills: 0 | Status: SHIPPED | OUTPATIENT
Start: 2025-01-22

## 2025-01-22 RX ORDER — CELECOXIB 200 MG/1
200 CAPSULE ORAL 2 TIMES DAILY PRN
COMMUNITY
Start: 2024-11-13

## 2025-01-22 RX ORDER — HYDROXYZINE HYDROCHLORIDE 25 MG/1
TABLET, FILM COATED ORAL
COMMUNITY
Start: 2024-08-12

## 2025-01-22 RX ORDER — HYDROCODONE BITARTRATE AND ACETAMINOPHEN 10; 325 MG/1; MG/1
TABLET ORAL
COMMUNITY
Start: 2024-11-13

## 2025-04-01 RX ORDER — TIRZEPATIDE 7.5 MG/.5ML
7.5 INJECTION, SOLUTION SUBCUTANEOUS WEEKLY
Qty: 2 ML | Refills: 0 | Status: SHIPPED | OUTPATIENT
Start: 2025-04-01 | End: 2025-04-23

## 2025-04-01 NOTE — TELEPHONE ENCOUNTER
Requested Prescriptions     Pending Prescriptions Disp Refills    Tirzepatide-Weight Management (ZEPBOUND) 7.5 MG/0.5ML Subcutaneous Solution Auto-injector 2 mL 0     Sig: Inject 7.5 mg into the skin once a week for 4 doses.      LOV: 01/22/25  RTC: 04/08/25  Filled: 01/22/25 #2 with 0 refills    Future Appointments   Date Time Provider Department Center   4/8/2025 10:30 AM Sara Williamson APN [facility] [address]   7/31/2025 10:40 AM Jennifer Cramer MD [facility] [address]

## 2025-04-07 PROBLEM — R79.89 ELEVATED SERUM CREATININE: Status: ACTIVE | Noted: 2025-04-07

## 2025-04-07 PROBLEM — Z51.81 THERAPEUTIC DRUG MONITORING: Status: ACTIVE | Noted: 2025-04-07

## 2025-04-07 PROBLEM — E66.811 CLASS 1 OBESITY DUE TO EXCESS CALORIES WITHOUT SERIOUS COMORBIDITY WITH BODY MASS INDEX (BMI) OF 30.0 TO 30.9 IN ADULT: Status: ACTIVE | Noted: 2025-04-07

## 2025-04-07 PROBLEM — E66.09 CLASS 1 OBESITY DUE TO EXCESS CALORIES WITHOUT SERIOUS COMORBIDITY WITH BODY MASS INDEX (BMI) OF 30.0 TO 30.9 IN ADULT: Status: ACTIVE | Noted: 2025-04-07

## 2025-04-07 NOTE — PROGRESS NOTES
HISTORY OF PRESENT ILLNESS  Chief Complaint   Patient presents with    Weight Check     01/22/2025  Wt: 175 lb   Up 1#     Nikia Strickland is a 49 year old female here for follow up in medical weight loss program.     Denies chest pain, shortness of breath, dizziness, blurred vision, headache, paresthesia, nausea/vomiting.     Pt states she is doing well and has been currently injecting Zepbound 5mg once a week. Pt states today her Zepbound dose will increase to 7.5mg injections.   Pt states her goal is to get down to 150-155lbs again. Pt states she got done to that weight around 10/2023 while on the highest dose of Wegovy. Pt states she felt great about her weight but then all of a sudden she started vomiting and getting anxiety and panic attacks.  Pt sates she get off the Weogvy at that time and then got switched over to Zepbound.  Pt states she feels the weight loss is slower right now on the Zepbound but she is not experiencing any adverse effects.  Pt states she noticed she is using her readers more when she is trying to read and wonders if her eyesight is worsening?    Wt Readings from Last 6 Encounters:   04/08/25 174 lb (78.9 kg)   01/22/25 175 lb (79.4 kg)   06/25/24 157 lb (71.2 kg)   06/21/24 152 lb (68.9 kg)   10/06/23 155 lb (70.3 kg)   09/26/23 161 lb (73 kg)          Breakfast Lunch Dinner Snacks Fluids   Reviewed           REVIEW OF SYSTEMS  GENERAL HEALTH: feels well otherwise, denied any fevers chills or night sweats   RESPIRATORY: denies shortness of breath   CARDIOVASCULAR: denies chest pain  GI: denies abdominal pain    EXAM  /82   Pulse 66   Resp 16   Ht 5' 8\" (1.727 m)   Wt 174 lb (78.9 kg)   LMP 03/28/2025 (Exact Date)   SpO2 99%   BMI 26.46 kg/m²   GENERAL: well developed, well nourished,in no apparent distress, A/O x3  SKIN: no rashes,no suspicious lesions  HEENT: atraumatic, normocephalic, OP-clear, PERRL  NECK: supple,no adenopathy  LUNGS: clear to auscultation  bilaterally   CARDIO: RRR without murmur  GI: good BS's,NT/ND, no masses or HSM  EXTREMITIES: no cyanosis, no clubbing, no edema    Lab Results   Component Value Date    WBC 7.2 05/05/2022    RBC 4.48 05/05/2022    HGB 13.7 05/05/2022    HCT 41.5 05/05/2022    MCV 84.6 06/21/2024    MCH 30.6 05/05/2022    MCHC 33.3 06/21/2024    RDW 11.9 05/05/2022    .0 05/05/2022     Lab Results   Component Value Date     (H) 06/25/2024    BUN 11 06/25/2024    CREATSERUM 1.13 (H) 06/25/2024    ANIONGAP 4 06/25/2024    GFR 79 04/08/2016    GFRNAA 87 05/05/2022    GFRAA 101 05/05/2022    CA 8.9 06/25/2024    OSMOCALC 288 06/25/2024    ALKPHO 74 06/25/2024    AST 17 06/25/2024    ALT 22 06/25/2024    BILT 0.4 06/25/2024    TP 7.3 06/25/2024    ALB 3.8 06/25/2024    GLOBULIN 3.5 06/25/2024     06/25/2024    K 4.2 06/25/2024     06/25/2024    CO2 27.0 06/25/2024     Lab Results   Component Value Date     05/05/2022    A1C 5.4 05/05/2022     Lab Results   Component Value Date    CHOLEST 205 (H) 04/08/2016    TRIG 53 04/08/2016    HDL 97 04/08/2016    LDL 97 04/08/2016    VLDL 11 04/08/2016    TCHDLRATIO 2.11 04/08/2016    NONHDLC 108 04/08/2016     Lab Results   Component Value Date    T4F 1.1 05/05/2022    TSH 1.430 05/05/2022     Lab Results   Component Value Date    B12 887 05/05/2022     Lab Results   Component Value Date    VITD 17.3 (L) 05/05/2022       Current Outpatient Medications on File Prior to Visit   Medication Sig Dispense Refill    Tirzepatide-Weight Management (ZEPBOUND) 7.5 MG/0.5ML Subcutaneous Solution Auto-injector Inject 7.5 mg into the skin once a week for 4 doses. 2 mL 0    cephALEXin 500 MG Oral Cap Take 1 capsule (500 mg total) by mouth 2 (two) times daily. (Patient not taking: Reported on 4/8/2025)      famotidine 20 MG Oral Tab Take 1 tablet (20 mg total) by mouth daily. (Patient not taking: Reported on 4/8/2025)      gabapentin 300 MG Oral Cap Take 1 capsule (300 mg total) by  mouth 3 (three) times daily as needed. (Patient not taking: Reported on 4/8/2025)      hydrOXYzine 25 MG Oral Tab TAKE 1 TABLET BY MOUTH FOUR TIMES DAILY FOR 10 DAYS AS NEEDED FOR ANXIETY (Patient not taking: Reported on 4/8/2025)       No current facility-administered medications on file prior to visit.     ASSESSMENT  Analyzed weight data:       Diagnoses and all orders for this visit:    Therapeutic drug monitoring    Pure hypercholesterolemia    Elevated serum creatinine    Class 1 obesity due to excess calories without serious comorbidity with body mass index (BMI) of 30.0 to 30.9 in adult    PLAN  5/5/22 at 192 - BMI was 30.06  Total weight loss: 18lbs  Total time spent on chart review, pre-charting, obtaining history, counseling, and educating, reviewing labs was 30 minutes.    Reviewed GI symptoms - none at this time  Pt does have some changes to her eyesight and using readers - will talk with her PCP or see an opthalmology for eye exam  Reviewed labs - need to be redone? - f/u with PCP    Continue zepbound 7.5mg weekly injections  Pt going to message at week 3 and provide her weight and if she wants to increase to 10mg  Will likely send 10mg of zepbound for 2 months until pt has next f/u in July   advised of side effects and adverse effects of this medication  Did discuss dose titration and maintenance dosing in the future  Nutrition: low carb diet/ recommended to eat breakfast daily/ regular protein intake  Medication use and side effects reviewed with patient.  Medications tried in the past:   Did not tolerate the wegovy retrial, increased anxiety and gi symptoms returned  Phentermine  Saxenda  Follow up with dietitian and psychologist as recommended.  Discussed the role of sleep and stress in weight management.  Counseled on comprehensive weight loss plan including attention to nutrition, exercise and behavior/stress management for success. See patient instruction below for more details.  Discussed  strategies to overcome barriers to successful weight loss and weight maintenance  FITTE: ACSM recommendations (150-300 minutes/ week in active weight loss)   Weight Loss consent to treat reviewed and signed     There are no Patient Instructions on file for this visit.    Follow-up: July 31, 2025    Patient verbalizes understanding.    Sara Williamson DNP, FNP-BC

## 2025-04-08 ENCOUNTER — OFFICE VISIT (OUTPATIENT)
Dept: INTERNAL MEDICINE CLINIC | Facility: CLINIC | Age: 50
End: 2025-04-08
Payer: COMMERCIAL

## 2025-04-08 VITALS
RESPIRATION RATE: 16 BRPM | DIASTOLIC BLOOD PRESSURE: 82 MMHG | OXYGEN SATURATION: 99 % | HEIGHT: 68 IN | BODY MASS INDEX: 26.37 KG/M2 | WEIGHT: 174 LBS | SYSTOLIC BLOOD PRESSURE: 120 MMHG | HEART RATE: 66 BPM

## 2025-04-08 DIAGNOSIS — E78.00 PURE HYPERCHOLESTEROLEMIA: ICD-10-CM

## 2025-04-08 DIAGNOSIS — E66.09 CLASS 1 OBESITY DUE TO EXCESS CALORIES WITHOUT SERIOUS COMORBIDITY WITH BODY MASS INDEX (BMI) OF 30.0 TO 30.9 IN ADULT: ICD-10-CM

## 2025-04-08 DIAGNOSIS — Z51.81 THERAPEUTIC DRUG MONITORING: Primary | ICD-10-CM

## 2025-04-08 DIAGNOSIS — R79.89 ELEVATED SERUM CREATININE: ICD-10-CM

## 2025-04-08 DIAGNOSIS — E66.811 CLASS 1 OBESITY DUE TO EXCESS CALORIES WITHOUT SERIOUS COMORBIDITY WITH BODY MASS INDEX (BMI) OF 30.0 TO 30.9 IN ADULT: ICD-10-CM

## 2025-04-23 RX ORDER — TIRZEPATIDE 10 MG/.5ML
10 INJECTION, SOLUTION SUBCUTANEOUS WEEKLY
Qty: 2 ML | Refills: 0 | Status: SHIPPED | OUTPATIENT
Start: 2025-04-23 | End: 2025-05-15

## 2025-04-23 NOTE — TELEPHONE ENCOUNTER
Requesting   Requested Prescriptions     Pending Prescriptions Disp Refills    Tirzepatide-Weight Management (ZEPBOUND) 10 MG/0.5ML Subcutaneous Solution Auto-injector 2 mL 0     Sig: Inject 10 mg into the skin once a week for 4 doses.       LOV: 04/08/25  RTC: 07/31/25  Filled: 04/01/25 #2 with 0 refills    Future Appointments   Date Time Provider Department Center   7/31/2025 10:40 AM Jennifer Cramer MD [facility]

## 2025-05-27 RX ORDER — TIRZEPATIDE 7.5 MG/.5ML
INJECTION, SOLUTION SUBCUTANEOUS
COMMUNITY

## 2025-05-29 RX ORDER — TIRZEPATIDE 7.5 MG/.5ML
INJECTION, SOLUTION SUBCUTANEOUS
Refills: 0 | OUTPATIENT
Start: 2025-05-29

## 2025-06-01 ENCOUNTER — PATIENT MESSAGE (OUTPATIENT)
Dept: INTERNAL MEDICINE CLINIC | Facility: CLINIC | Age: 50
End: 2025-06-01

## 2025-06-03 RX ORDER — TIRZEPATIDE 10 MG/.5ML
10 INJECTION, SOLUTION SUBCUTANEOUS WEEKLY
Qty: 2 ML | Refills: 1 | Status: SHIPPED | OUTPATIENT
Start: 2025-06-03 | End: 2025-06-25

## 2025-06-03 NOTE — TELEPHONE ENCOUNTER
Requesting   Requested Prescriptions     Pending Prescriptions Disp Refills    ZEPBOUND 10 MG/0.5ML Subcutaneous Solution Auto-injector [Pharmacy Med Name: ZEPBOUND 10 MG/0.5 ML PEN]  0     Sig: INJECT 10 MG INTO THE SKIN ONCE A WEEK FOR 4 DOSES.      LOV: 04/08/25  RTC: 07/31/25  Filled: 04/23/25 #2 with 0 refills    Future Appointments   Date Time Provider Department Center   7/31/2025 10:40 AM Jennifer Cramer MD [facility]

## 2025-07-05 ENCOUNTER — HOSPITAL ENCOUNTER (OUTPATIENT)
Age: 50
Discharge: HOME | End: 2025-07-05
Payer: COMMERCIAL

## 2025-07-05 ENCOUNTER — APPOINTMENT (OUTPATIENT)
Dept: GENERAL RADIOLOGY | Age: 50
End: 2025-07-05
Attending: PHYSICIAN ASSISTANT
Payer: COMMERCIAL

## 2025-07-05 VITALS
TEMPERATURE: 98 F | HEART RATE: 63 BPM | OXYGEN SATURATION: 99 % | RESPIRATION RATE: 16 BRPM | SYSTOLIC BLOOD PRESSURE: 148 MMHG | DIASTOLIC BLOOD PRESSURE: 82 MMHG

## 2025-07-05 DIAGNOSIS — S69.91XA INJURY OF RIGHT WRIST, INITIAL ENCOUNTER: Primary | ICD-10-CM

## 2025-07-05 PROCEDURE — 99213 OFFICE O/P EST LOW 20 MIN: CPT | Performed by: PHYSICIAN ASSISTANT

## 2025-07-05 PROCEDURE — 73110 X-RAY EXAM OF WRIST: CPT | Performed by: PHYSICIAN ASSISTANT

## 2025-07-05 PROCEDURE — L3908 WHO COCK-UP NONMOLDE PRE OTS: HCPCS | Performed by: PHYSICIAN ASSISTANT

## 2025-07-05 NOTE — ED PROVIDER NOTES
Patient Seen in: [facility]mediate [facility]      History  Chief Complaint   Patient presents with    Wrist Injury     Stated Complaint: Rt wrist injury    Subjective:   HPI            49 YO female presents to immediate care for evaluation of right wrist pain after trip and fall yesterday. Patient states she braced fall with right hand/wrist. Denies any other pain sites. Denies hitting her head or LOC.         Objective:     History reviewed. No pertinent past medical history.           History reviewed. No pertinent surgical history.             Social History     Socioeconomic History    Marital status:    Occupational History    Occupation:    Tobacco Use    Smoking status: Never    Smokeless tobacco: Never   Vaping Use    Vaping status: Never Used   Substance and Sexual Activity    Alcohol use: Yes     Alcohol/week: 1.0 - 2.0 standard drink of alcohol     Types: 1 - 2 Standard drinks or equivalent per week     Comment: wine 1-2 times a week    Drug use: No   Other Topics Concern     Service No    Caffeine Concern No    Exercise Yes     Comment: walking    Seat Belt Yes    Self-Exams Yes     Comment: self breast exam every 3 months              Review of Systems    Positive for stated complaint: Rt wrist injury  Other systems are as noted in HPI.  Constitutional and vital signs reviewed.      All other systems reviewed and negative except as noted above.        Physical Exam    ED Triage Vitals [07/05/25 1253]   /82   Pulse 63   Resp 16   Temp 98.1 °F (36.7 °C)   Temp src Oral   SpO2 99 %   O2 Device None (Room air)       Current Vitals:   Vital Signs  BP: 148/82  Pulse: 63  Resp: 16  Temp: 98.1 °F (36.7 °C)  Temp src: Oral    Oxygen Therapy  SpO2: 99 %  O2 Device: None (Room air)          Physical Exam  Vitals and nursing note reviewed.   Constitutional:       General: She is not in acute distress.     Appearance: Normal appearance. She is not ill-appearing,  toxic-appearing or diaphoretic.   HENT:      Head: Atraumatic.   Cardiovascular:      Rate and Rhythm: Normal rate.   Pulmonary:      Effort: Pulmonary effort is normal. No respiratory distress.   Musculoskeletal:      Right wrist: Tenderness (mild generalized tenderness) present. No swelling or snuff box tenderness. Normal range of motion.      Right hand: No swelling or tenderness.   Neurological:      Mental Status: She is alert and oriented to person, place, and time.   Psychiatric:         Behavior: Behavior normal.         ED Course  Labs Reviewed - No data to display  XR WRIST COMPLETE (MIN 3 VIEWS), RIGHT (CPT=73110)  Result Date: 7/5/2025  PROCEDURE: XR WRIST COMPLETE (MIN 3 VIEWS), RIGHT (CPT=73110) INDICATIONS: Rt wrist injury PATIENT STATED HISTORY: Right anterior wrist pain. Pt. fell and hyperextended her wrist. COMPARISON: No comparisons. TECHNIQUE: 3 views. FINDINGS: Normal joint spaces. No acute fracture or dislocation.     CONCLUSION: Negative for acute fracture. Electronically Verified and Signed by Attending Radiologist: Davonte Porras MD 7/5/2025 1:37 PM Workstation: [id number]    I independently reviewed images.  No acute fracture.     MDM     Differential diagnosis considered but not limited to contusion, sprain, fracture    Patient with mild, generalized tenderness to right wrist.  No appreciable swelling or obvious deformity.  X-ray right wrist negative for acute osseous findings.  Wrist velcro splint provided as supportive measure.  Conservative initial management discussed.  F/u with PCP if pain persist.        Medical Decision Making  Amount and/or Complexity of Data Reviewed  Radiology: ordered and independent interpretation performed. Decision-making details documented in ED Course.    Risk  OTC drugs.        Disposition and Plan     Clinical Impression:  1. Injury of right wrist, initial encounter         Disposition:  Discharge  7/5/2025  2:00 pm    Follow-up:  Fabien Camacho MD  [id number] [place] [id number]  [phone]      As needed          Medications Prescribed:  Discharge Medication List as of 7/5/2025  2:02 PM                Supplementary Documentation:

## 2025-07-31 ENCOUNTER — OFFICE VISIT (OUTPATIENT)
Dept: INTERNAL MEDICINE CLINIC | Facility: CLINIC | Age: 50
End: 2025-07-31
Payer: COMMERCIAL

## 2025-07-31 ENCOUNTER — TELEPHONE (OUTPATIENT)
Dept: INTERNAL MEDICINE CLINIC | Facility: CLINIC | Age: 50
End: 2025-07-31

## 2025-07-31 VITALS
HEIGHT: 68 IN | HEART RATE: 89 BPM | BODY MASS INDEX: 23.49 KG/M2 | WEIGHT: 155 LBS | DIASTOLIC BLOOD PRESSURE: 76 MMHG | SYSTOLIC BLOOD PRESSURE: 118 MMHG | RESPIRATION RATE: 18 BRPM

## 2025-07-31 DIAGNOSIS — E78.00 PURE HYPERCHOLESTEROLEMIA: ICD-10-CM

## 2025-07-31 DIAGNOSIS — E66.811 CLASS 1 OBESITY DUE TO EXCESS CALORIES WITHOUT SERIOUS COMORBIDITY WITH BODY MASS INDEX (BMI) OF 30.0 TO 30.9 IN ADULT: ICD-10-CM

## 2025-07-31 DIAGNOSIS — Z51.81 THERAPEUTIC DRUG MONITORING: Primary | ICD-10-CM

## 2025-07-31 DIAGNOSIS — E66.09 CLASS 1 OBESITY DUE TO EXCESS CALORIES WITHOUT SERIOUS COMORBIDITY WITH BODY MASS INDEX (BMI) OF 30.0 TO 30.9 IN ADULT: ICD-10-CM

## 2025-07-31 PROCEDURE — 3074F SYST BP LT 130 MM HG: CPT | Performed by: INTERNAL MEDICINE

## 2025-07-31 PROCEDURE — 3008F BODY MASS INDEX DOCD: CPT | Performed by: INTERNAL MEDICINE

## 2025-07-31 PROCEDURE — 3078F DIAST BP <80 MM HG: CPT | Performed by: INTERNAL MEDICINE

## 2025-07-31 PROCEDURE — 99213 OFFICE O/P EST LOW 20 MIN: CPT | Performed by: INTERNAL MEDICINE

## 2025-07-31 RX ORDER — TIRZEPATIDE 5 MG/.5ML
5 INJECTION, SOLUTION SUBCUTANEOUS WEEKLY
Qty: 6 ML | Refills: 0 | Status: SHIPPED | OUTPATIENT
Start: 2025-07-31

## 2025-07-31 RX ORDER — TIRZEPATIDE 10 MG/.5ML
INJECTION, SOLUTION SUBCUTANEOUS
COMMUNITY
Start: 2025-07-02 | End: 2025-07-31

## (undated) NOTE — ED AVS SNAPSHOT
Anne-Marie Quinteros   MRN: [id number]    Department:  [facility] Emergency Department   Date of Visit:  7/6/2018           Disclosure     Insurance plans vary and the physician(s) referred by the ER may not be covered by your plan.  Please contact you tell this physician (or your personal doctor if your instructions are to return to your personal doctor) about any new or lasting problems. The primary care or specialist physician will see patients referred from the [facility] Emergency Department.  Phoenix Tobar

## (undated) NOTE — Clinical Note
Hi, this patient was in immediate care over the weekend for diarrhea/ n//v. Now 4th occurrence over past 6 months. I did check some bmp, amylase, lipase that shows some mild dehydration otherwise stable. I am having her start to wean off wegovy if it is contributing and check ct/a/p as patient is considered about alternative internal process. I did advise her to follow up with you asap as well. Thanks! N